# Patient Record
Sex: FEMALE | Race: WHITE | Employment: FULL TIME | URBAN - METROPOLITAN AREA
[De-identification: names, ages, dates, MRNs, and addresses within clinical notes are randomized per-mention and may not be internally consistent; named-entity substitution may affect disease eponyms.]

---

## 2017-01-06 RX ORDER — LORAZEPAM 1 MG/1
TABLET ORAL
Qty: 60 TAB | Refills: 0 | Status: SHIPPED | OUTPATIENT
Start: 2017-01-06 | End: 2017-02-08 | Stop reason: SDUPTHER

## 2017-01-09 RX ORDER — IBUPROFEN 800 MG/1
TABLET ORAL
Qty: 90 TAB | Refills: 0 | Status: SHIPPED | OUTPATIENT
Start: 2017-01-09 | End: 2017-03-08 | Stop reason: SDUPTHER

## 2017-02-08 RX ORDER — LORAZEPAM 1 MG/1
TABLET ORAL
Qty: 60 TAB | Refills: 0 | Status: SHIPPED | OUTPATIENT
Start: 2017-02-08 | End: 2017-02-09 | Stop reason: SDUPTHER

## 2017-02-10 RX ORDER — LORAZEPAM 1 MG/1
TABLET ORAL
Qty: 60 TAB | Refills: 0 | Status: SHIPPED | OUTPATIENT
Start: 2017-02-10 | End: 2017-03-13 | Stop reason: SDUPTHER

## 2017-03-05 RX ORDER — BENAZEPRIL HYDROCHLORIDE 10 MG/1
TABLET ORAL
Qty: 30 TAB | Refills: 0 | Status: SHIPPED | OUTPATIENT
Start: 2017-03-05 | End: 2017-04-03 | Stop reason: SDUPTHER

## 2017-03-08 RX ORDER — IBUPROFEN 800 MG/1
TABLET ORAL
Qty: 90 TAB | Refills: 0 | Status: SHIPPED | OUTPATIENT
Start: 2017-03-08 | End: 2017-05-17 | Stop reason: SDUPTHER

## 2017-03-13 RX ORDER — LORAZEPAM 1 MG/1
TABLET ORAL
Qty: 60 TAB | Refills: 0 | Status: SHIPPED | OUTPATIENT
Start: 2017-03-13 | End: 2017-04-14 | Stop reason: SDUPTHER

## 2017-04-03 RX ORDER — BENAZEPRIL HYDROCHLORIDE 10 MG/1
TABLET ORAL
Qty: 30 TAB | Refills: 0 | Status: SHIPPED | OUTPATIENT
Start: 2017-04-03 | End: 2017-05-05 | Stop reason: SDUPTHER

## 2017-04-14 RX ORDER — LORAZEPAM 1 MG/1
TABLET ORAL
Qty: 60 TAB | Refills: 0 | Status: SHIPPED | OUTPATIENT
Start: 2017-04-14 | End: 2017-05-15 | Stop reason: SDUPTHER

## 2017-05-05 RX ORDER — BENAZEPRIL HYDROCHLORIDE 10 MG/1
TABLET ORAL
Qty: 30 TAB | Refills: 0 | Status: SHIPPED | OUTPATIENT
Start: 2017-05-05 | End: 2017-06-05 | Stop reason: SDUPTHER

## 2017-05-15 RX ORDER — LORAZEPAM 1 MG/1
TABLET ORAL
Qty: 60 TAB | Refills: 0 | Status: SHIPPED | OUTPATIENT
Start: 2017-05-15 | End: 2017-07-13 | Stop reason: SDUPTHER

## 2017-05-16 RX ORDER — CEPHALEXIN 500 MG/1
CAPSULE ORAL
Qty: 30 CAP | Refills: 0 | Status: SHIPPED | OUTPATIENT
Start: 2017-05-16 | End: 2017-07-04 | Stop reason: SDUPTHER

## 2017-05-16 RX ORDER — ACYCLOVIR 800 MG/1
800 TABLET ORAL 3 TIMES DAILY
Qty: 30 TAB | Refills: 0 | Status: SHIPPED | OUTPATIENT
Start: 2017-05-16 | End: 2017-05-26

## 2017-05-16 NOTE — TELEPHONE ENCOUNTER
Has break out around mouth, fever blisters, started yesterday, can Zovirax be called in, pills or ointment, which ever is cheaper. Call to [address] at [facility], [place], wants to  on her lunch time.  [phone]

## 2017-05-17 RX ORDER — IBUPROFEN 800 MG/1
TABLET ORAL
Qty: 90 TAB | Refills: 0 | Status: SHIPPED | OUTPATIENT
Start: 2017-05-17 | End: 2017-07-07 | Stop reason: SDUPTHER

## 2017-06-06 RX ORDER — BENAZEPRIL HYDROCHLORIDE 10 MG/1
TABLET ORAL
Qty: 30 TAB | Refills: 0 | Status: SHIPPED | OUTPATIENT
Start: 2017-06-06 | End: 2017-07-06 | Stop reason: SDUPTHER

## 2017-07-04 ENCOUNTER — TELEPHONE (OUTPATIENT)
Dept: INTERNAL MEDICINE CLINIC | Age: 57
End: 2017-07-04

## 2017-07-04 RX ORDER — CEPHALEXIN 500 MG/1
CAPSULE ORAL
Qty: 20 CAP | Refills: 0 | Status: SHIPPED | OUTPATIENT
Start: 2017-07-04 | End: 2018-10-21 | Stop reason: ALTCHOICE

## 2017-07-04 RX ORDER — CIPROFLOXACIN HYDROCHLORIDE 3.5 MG/ML
SOLUTION/ DROPS TOPICAL
Qty: 5 ML | Refills: 1 | Status: SHIPPED | OUTPATIENT
Start: 2017-07-04 | End: 2018-10-21 | Stop reason: ALTCHOICE

## 2017-07-06 RX ORDER — BENAZEPRIL HYDROCHLORIDE 10 MG/1
TABLET ORAL
Qty: 30 TAB | Refills: 0 | Status: SHIPPED | OUTPATIENT
Start: 2017-07-06 | End: 2017-08-07 | Stop reason: SDUPTHER

## 2017-07-10 RX ORDER — IBUPROFEN 800 MG/1
TABLET ORAL
Qty: 90 TAB | Refills: 0 | Status: SHIPPED | OUTPATIENT
Start: 2017-07-10 | End: 2017-09-05 | Stop reason: SDUPTHER

## 2017-07-13 RX ORDER — LORAZEPAM 1 MG/1
TABLET ORAL
Qty: 60 TAB | Refills: 0 | Status: SHIPPED | OUTPATIENT
Start: 2017-07-13 | End: 2017-08-15 | Stop reason: SDUPTHER

## 2017-08-07 RX ORDER — BENAZEPRIL HYDROCHLORIDE 10 MG/1
TABLET ORAL
Qty: 30 TAB | Refills: 0 | Status: SHIPPED | OUTPATIENT
Start: 2017-08-07 | End: 2017-09-07 | Stop reason: SDUPTHER

## 2017-08-15 RX ORDER — LORAZEPAM 1 MG/1
TABLET ORAL
Qty: 60 TAB | Refills: 0 | Status: SHIPPED | OUTPATIENT
Start: 2017-08-15 | End: 2017-08-16 | Stop reason: SDUPTHER

## 2017-08-16 RX ORDER — LORAZEPAM 1 MG/1
TABLET ORAL
Qty: 60 TAB | Refills: 0 | Status: SHIPPED | OUTPATIENT
Start: 2017-08-16 | End: 2017-10-24 | Stop reason: SDUPTHER

## 2017-08-23 RX ORDER — CEPHALEXIN 500 MG/1
CAPSULE ORAL
Qty: 30 CAP | Refills: 0 | Status: SHIPPED | OUTPATIENT
Start: 2017-08-23 | End: 2018-10-21 | Stop reason: ALTCHOICE

## 2017-09-05 RX ORDER — IBUPROFEN 800 MG/1
TABLET ORAL
Qty: 90 TAB | Refills: 0 | Status: SHIPPED | OUTPATIENT
Start: 2017-09-05 | End: 2017-10-23 | Stop reason: SDUPTHER

## 2017-09-07 RX ORDER — BENAZEPRIL HYDROCHLORIDE 10 MG/1
TABLET ORAL
Qty: 30 TAB | Refills: 0 | Status: SHIPPED | OUTPATIENT
Start: 2017-09-07 | End: 2017-10-04 | Stop reason: SDUPTHER

## 2017-10-04 RX ORDER — BENAZEPRIL HYDROCHLORIDE 10 MG/1
TABLET ORAL
Qty: 30 TAB | Refills: 0 | Status: SHIPPED | OUTPATIENT
Start: 2017-10-04 | End: 2017-11-06 | Stop reason: SDUPTHER

## 2017-10-23 ENCOUNTER — TELEPHONE (OUTPATIENT)
Dept: INTERNAL MEDICINE CLINIC | Age: 57
End: 2017-10-23

## 2017-10-23 RX ORDER — AMOXICILLIN 500 MG/1
CAPSULE ORAL
Qty: 30 CAP | Refills: 0 | Status: SHIPPED | OUTPATIENT
Start: 2017-10-23 | End: 2018-01-03 | Stop reason: SDUPTHER

## 2017-10-23 RX ORDER — IBUPROFEN 800 MG/1
TABLET ORAL
Qty: 90 TAB | Refills: 0 | Status: SHIPPED | OUTPATIENT
Start: 2017-10-23 | End: 2017-12-05 | Stop reason: SDUPTHER

## 2017-10-24 RX ORDER — LORAZEPAM 1 MG/1
TABLET ORAL
Qty: 60 TAB | Refills: 0 | Status: SHIPPED | OUTPATIENT
Start: 2017-10-24 | End: 2017-11-26 | Stop reason: SDUPTHER

## 2017-11-06 RX ORDER — BENAZEPRIL HYDROCHLORIDE 10 MG/1
TABLET ORAL
Qty: 30 TAB | Refills: 0 | Status: SHIPPED | OUTPATIENT
Start: 2017-11-06 | End: 2017-12-05 | Stop reason: SDUPTHER

## 2017-11-26 RX ORDER — LORAZEPAM 1 MG/1
TABLET ORAL
Qty: 60 TAB | Refills: 0 | Status: SHIPPED | OUTPATIENT
Start: 2017-11-26 | End: 2017-11-27 | Stop reason: SDUPTHER

## 2017-11-27 ENCOUNTER — TELEPHONE (OUTPATIENT)
Dept: INTERNAL MEDICINE CLINIC | Age: 57
End: 2017-11-27

## 2017-11-27 RX ORDER — LORAZEPAM 1 MG/1
TABLET ORAL
Qty: 14 TAB | Refills: 0 | Status: SHIPPED | OUTPATIENT
Start: 2017-11-27 | End: 2017-12-17 | Stop reason: SDUPTHER

## 2017-11-27 NOTE — TELEPHONE ENCOUNTER
Patient only wants a week prescription for now. Does not have enough money for the full prescription right now.      Requested Prescriptions     Pending Prescriptions Disp Refills    LORazepam (ATIVAN) 1 mg tablet 60 Tab 0

## 2017-12-05 RX ORDER — IBUPROFEN 800 MG/1
TABLET ORAL
Qty: 90 TAB | Refills: 0 | Status: SHIPPED | OUTPATIENT
Start: 2017-12-05 | End: 2018-02-07 | Stop reason: SDUPTHER

## 2017-12-05 RX ORDER — BENAZEPRIL HYDROCHLORIDE 10 MG/1
TABLET ORAL
Qty: 30 TAB | Refills: 0 | Status: SHIPPED | OUTPATIENT
Start: 2017-12-05 | End: 2018-01-29 | Stop reason: SDUPTHER

## 2017-12-17 RX ORDER — LORAZEPAM 1 MG/1
TABLET ORAL
Qty: 14 TAB | Refills: 0 | Status: SHIPPED | OUTPATIENT
Start: 2017-12-17 | End: 2017-12-22 | Stop reason: SDUPTHER

## 2017-12-18 ENCOUNTER — TELEPHONE (OUTPATIENT)
Dept: INTERNAL MEDICINE CLINIC | Age: 57
End: 2017-12-18

## 2017-12-22 DIAGNOSIS — F43.9 STRESS: Primary | ICD-10-CM

## 2017-12-22 RX ORDER — LORAZEPAM 1 MG/1
TABLET ORAL
Qty: 14 TAB | Refills: 0 | Status: SHIPPED | OUTPATIENT
Start: 2017-12-22 | End: 2018-01-08 | Stop reason: SDUPTHER

## 2017-12-22 NOTE — TELEPHONE ENCOUNTER
Requested Prescriptions     Pending Prescriptions Disp Refills    LORazepam (ATIVAN) 1 mg tablet 14 Tab 0        PATIENT WILL BE OUT Monday MORNING

## 2018-01-03 RX ORDER — AMOXICILLIN 500 MG/1
CAPSULE ORAL
Qty: 30 CAP | Refills: 0 | Status: SHIPPED | OUTPATIENT
Start: 2018-01-03 | End: 2018-03-09 | Stop reason: SDUPTHER

## 2018-01-08 DIAGNOSIS — F43.9 STRESS: ICD-10-CM

## 2018-01-08 RX ORDER — LORAZEPAM 1 MG/1
TABLET ORAL
Qty: 60 TAB | Refills: 0 | Status: SHIPPED | OUTPATIENT
Start: 2018-01-08 | End: 2018-01-22 | Stop reason: SDUPTHER

## 2018-01-08 NOTE — TELEPHONE ENCOUNTER
Should be month supply qty needs to be updated      Requested Prescriptions     Pending Prescriptions Disp Refills    LORazepam (ATIVAN) 1 mg tablet 14 Tab 0     Sig: TAKE 1 TABLET BY MOUTH TWICE DAILY     Qty should be 28 not 14

## 2018-01-08 NOTE — TELEPHONE ENCOUNTER
Called RX for Ativan into [address] for Ativan #30 for quantity vs #60 as patient can't afford #60.

## 2018-01-22 ENCOUNTER — TELEPHONE (OUTPATIENT)
Dept: INTERNAL MEDICINE CLINIC | Age: 58
End: 2018-01-22

## 2018-01-22 DIAGNOSIS — F43.9 STRESS: ICD-10-CM

## 2018-01-22 RX ORDER — LORAZEPAM 1 MG/1
TABLET ORAL
Qty: 60 TAB | Refills: 0 | Status: SHIPPED | OUTPATIENT
Start: 2018-01-22 | End: 2018-02-21 | Stop reason: SDUPTHER

## 2018-01-29 RX ORDER — BENAZEPRIL HYDROCHLORIDE 10 MG/1
TABLET ORAL
Qty: 30 TAB | Refills: 0 | Status: SHIPPED | OUTPATIENT
Start: 2018-01-29 | End: 2018-02-26 | Stop reason: SDUPTHER

## 2018-02-07 RX ORDER — IBUPROFEN 800 MG/1
TABLET ORAL
Qty: 90 TAB | Refills: 0 | Status: SHIPPED | OUTPATIENT
Start: 2018-02-07 | End: 2018-05-07 | Stop reason: SDUPTHER

## 2018-02-21 ENCOUNTER — TELEPHONE (OUTPATIENT)
Dept: INTERNAL MEDICINE CLINIC | Age: 58
End: 2018-02-21

## 2018-02-21 DIAGNOSIS — F43.9 STRESS: ICD-10-CM

## 2018-02-21 RX ORDER — LORAZEPAM 1 MG/1
TABLET ORAL
Qty: 60 TAB | Refills: 0 | Status: SHIPPED | OUTPATIENT
Start: 2018-02-21 | End: 2018-03-19 | Stop reason: SDUPTHER

## 2018-02-26 RX ORDER — BENAZEPRIL HYDROCHLORIDE 10 MG/1
TABLET ORAL
Qty: 30 TAB | Refills: 0 | Status: SHIPPED | OUTPATIENT
Start: 2018-02-26 | End: 2018-03-27 | Stop reason: SDUPTHER

## 2018-03-09 RX ORDER — AMOXICILLIN 500 MG/1
CAPSULE ORAL
Qty: 30 CAP | Refills: 0 | Status: SHIPPED | OUTPATIENT
Start: 2018-03-09 | End: 2018-04-03 | Stop reason: SDUPTHER

## 2018-03-19 DIAGNOSIS — F43.9 STRESS: ICD-10-CM

## 2018-03-20 ENCOUNTER — TELEPHONE (OUTPATIENT)
Dept: INTERNAL MEDICINE CLINIC | Age: 58
End: 2018-03-20

## 2018-03-20 RX ORDER — LORAZEPAM 1 MG/1
TABLET ORAL
Qty: 60 TAB | Refills: 0 | Status: SHIPPED | OUTPATIENT
Start: 2018-03-20 | End: 2018-04-20 | Stop reason: SDUPTHER

## 2018-03-27 RX ORDER — BENAZEPRIL HYDROCHLORIDE 10 MG/1
TABLET ORAL
Qty: 30 TAB | Refills: 0 | Status: SHIPPED | OUTPATIENT
Start: 2018-03-27 | End: 2018-04-25 | Stop reason: SDUPTHER

## 2018-04-03 RX ORDER — AMOXICILLIN 500 MG/1
CAPSULE ORAL
Qty: 30 CAP | Refills: 0 | Status: SHIPPED | OUTPATIENT
Start: 2018-04-03 | End: 2018-06-19 | Stop reason: SDUPTHER

## 2018-04-20 DIAGNOSIS — F43.9 STRESS: ICD-10-CM

## 2018-04-20 RX ORDER — LORAZEPAM 1 MG/1
TABLET ORAL
Qty: 60 TAB | Refills: 0 | Status: SHIPPED | OUTPATIENT
Start: 2018-04-20 | End: 2018-05-28 | Stop reason: SDUPTHER

## 2018-04-25 RX ORDER — BENAZEPRIL HYDROCHLORIDE 10 MG/1
TABLET ORAL
Qty: 30 TAB | Refills: 0 | Status: SHIPPED | OUTPATIENT
Start: 2018-04-25 | End: 2018-05-26 | Stop reason: SDUPTHER

## 2018-05-07 RX ORDER — IBUPROFEN 800 MG/1
TABLET ORAL
Qty: 90 TAB | Refills: 0 | Status: SHIPPED | OUTPATIENT
Start: 2018-05-07 | End: 2018-07-06 | Stop reason: SDUPTHER

## 2018-05-27 RX ORDER — BENAZEPRIL HYDROCHLORIDE 10 MG/1
TABLET ORAL
Qty: 30 TAB | Refills: 0 | Status: SHIPPED | OUTPATIENT
Start: 2018-05-27 | End: 2018-06-25 | Stop reason: SDUPTHER

## 2018-05-28 DIAGNOSIS — F43.9 STRESS: ICD-10-CM

## 2018-05-29 RX ORDER — BENAZEPRIL HYDROCHLORIDE 10 MG/1
TABLET ORAL
Qty: 30 TAB | Refills: 0 | Status: SHIPPED | OUTPATIENT
Start: 2018-05-29 | End: 2018-09-11 | Stop reason: SDUPTHER

## 2018-05-29 RX ORDER — LORAZEPAM 1 MG/1
TABLET ORAL
Qty: 60 TAB | Refills: 0 | Status: SHIPPED | OUTPATIENT
Start: 2018-05-29 | End: 2018-06-13 | Stop reason: SDUPTHER

## 2018-06-13 DIAGNOSIS — F43.9 STRESS: ICD-10-CM

## 2018-06-13 NOTE — TELEPHONE ENCOUNTER
Requested Prescriptions     Pending Prescriptions Disp Refills    LORazepam (ATIVAN) 1 mg tablet 60 Tab 0

## 2018-06-14 RX ORDER — LORAZEPAM 1 MG/1
TABLET ORAL
Qty: 60 TAB | Refills: 0 | Status: SHIPPED | OUTPATIENT
Start: 2018-06-29 | End: 2018-07-26 | Stop reason: SDUPTHER

## 2018-06-19 RX ORDER — AMOXICILLIN 500 MG/1
CAPSULE ORAL
Qty: 30 CAP | Refills: 0 | Status: SHIPPED | OUTPATIENT
Start: 2018-06-19 | End: 2018-08-09 | Stop reason: SDUPTHER

## 2018-06-25 RX ORDER — BENAZEPRIL HYDROCHLORIDE 10 MG/1
TABLET ORAL
Qty: 30 TAB | Refills: 0 | Status: SHIPPED | OUTPATIENT
Start: 2018-06-25 | End: 2018-07-23 | Stop reason: SDUPTHER

## 2018-07-06 RX ORDER — IBUPROFEN 800 MG/1
TABLET ORAL
Qty: 90 TAB | Refills: 0 | Status: SHIPPED | OUTPATIENT
Start: 2018-07-06 | End: 2018-10-11 | Stop reason: SDUPTHER

## 2018-07-23 RX ORDER — BENAZEPRIL HYDROCHLORIDE 10 MG/1
TABLET ORAL
Qty: 30 TAB | Refills: 0 | Status: SHIPPED | OUTPATIENT
Start: 2018-07-23 | End: 2018-10-19 | Stop reason: ALTCHOICE

## 2018-07-26 DIAGNOSIS — F43.9 STRESS: ICD-10-CM

## 2018-07-26 RX ORDER — LORAZEPAM 1 MG/1
TABLET ORAL
Qty: 60 TAB | Refills: 0 | Status: SHIPPED | OUTPATIENT
Start: 2018-07-26 | End: 2018-08-24 | Stop reason: SDUPTHER

## 2018-07-27 ENCOUNTER — TELEPHONE (OUTPATIENT)
Dept: INTERNAL MEDICINE CLINIC | Age: 58
End: 2018-07-27

## 2018-08-12 RX ORDER — AMOXICILLIN 500 MG/1
CAPSULE ORAL
Qty: 30 CAP | Refills: 0 | Status: SHIPPED | OUTPATIENT
Start: 2018-08-12 | End: 2018-10-02 | Stop reason: SDUPTHER

## 2018-08-24 DIAGNOSIS — F43.9 STRESS: ICD-10-CM

## 2018-08-24 RX ORDER — LORAZEPAM 1 MG/1
TABLET ORAL
Qty: 60 TAB | Refills: 0 | Status: SHIPPED | OUTPATIENT
Start: 2018-08-24 | End: 2018-09-24 | Stop reason: SDUPTHER

## 2018-09-11 RX ORDER — BENAZEPRIL HYDROCHLORIDE 10 MG/1
TABLET ORAL
Qty: 30 TAB | Refills: 0 | Status: SHIPPED | OUTPATIENT
Start: 2018-09-11 | End: 2018-10-15 | Stop reason: SDUPTHER

## 2018-09-11 NOTE — TELEPHONE ENCOUNTER
Requested Prescriptions     Pending Prescriptions Disp Refills    benazepril (LOTENSIN) 10 mg tablet 30 Tab 0         Would like today

## 2018-09-24 DIAGNOSIS — F43.9 STRESS: ICD-10-CM

## 2018-09-24 RX ORDER — LORAZEPAM 1 MG/1
TABLET ORAL
Qty: 60 TAB | Refills: 0 | Status: CANCELLED | OUTPATIENT
Start: 2018-09-24

## 2018-09-24 NOTE — TELEPHONE ENCOUNTER
Requested Prescriptions     Pending Prescriptions Disp Refills    LORazepam (ATIVAN) 1 mg tablet 60 Tab 0     Sig: TAKE 1 TABLET BY MOUTH TWICE DAILY       Last refill on  8/24/18  #60

## 2018-09-25 RX ORDER — LORAZEPAM 1 MG/1
TABLET ORAL
Qty: 60 TAB | Refills: 0 | OUTPATIENT
Start: 2018-09-25 | End: 2018-10-19 | Stop reason: SDUPTHER

## 2018-09-25 NOTE — TELEPHONE ENCOUNTER
p-mp last filled 08/24/2018    Per verbal order from Dr Kenzie Dominguez signed and called to pharmacy

## 2018-10-03 RX ORDER — AMOXICILLIN 500 MG/1
CAPSULE ORAL
Qty: 30 CAP | Refills: 0 | Status: SHIPPED | OUTPATIENT
Start: 2018-10-03 | End: 2018-10-21 | Stop reason: ALTCHOICE

## 2018-10-11 RX ORDER — IBUPROFEN 800 MG/1
TABLET ORAL
Qty: 90 TAB | Refills: 0 | Status: SHIPPED | OUTPATIENT
Start: 2018-10-11 | End: 2018-10-21 | Stop reason: ALTCHOICE

## 2018-10-15 RX ORDER — BENAZEPRIL HYDROCHLORIDE 10 MG/1
TABLET ORAL
Qty: 30 TAB | Refills: 0 | Status: SHIPPED | OUTPATIENT
Start: 2018-10-15 | End: 2018-10-19 | Stop reason: ALTCHOICE

## 2018-10-19 ENCOUNTER — OFFICE VISIT (OUTPATIENT)
Dept: INTERNAL MEDICINE CLINIC | Age: 58
End: 2018-10-19

## 2018-10-19 VITALS
DIASTOLIC BLOOD PRESSURE: 98 MMHG | BODY MASS INDEX: 22.15 KG/M2 | WEIGHT: 125 LBS | OXYGEN SATURATION: 98 % | HEIGHT: 63 IN | HEART RATE: 91 BPM | TEMPERATURE: 98.2 F | SYSTOLIC BLOOD PRESSURE: 140 MMHG

## 2018-10-19 DIAGNOSIS — F43.9 STRESS: ICD-10-CM

## 2018-10-19 DIAGNOSIS — I10 ESSENTIAL HYPERTENSION: Primary | ICD-10-CM

## 2018-10-19 RX ORDER — BENAZEPRIL HYDROCHLORIDE 20 MG/1
20 TABLET ORAL DAILY
Qty: 90 TAB | Refills: 3 | Status: SHIPPED | OUTPATIENT
Start: 2018-10-19 | End: 2019-07-01 | Stop reason: SDUPTHER

## 2018-10-19 RX ORDER — LORAZEPAM 1 MG/1
TABLET ORAL
Qty: 60 TAB | Refills: 3 | Status: SHIPPED | OUTPATIENT
Start: 2018-10-23 | End: 2019-02-12 | Stop reason: SDUPTHER

## 2018-10-19 NOTE — PROGRESS NOTES
Chief Complaint   Patient presents with    New Patient       Depression Screening:  No flowsheet data found. Learning Assessment:  Learning Assessment 10/19/2018   PRIMARY LEARNER Patient   HIGHEST LEVEL OF EDUCATION - PRIMARY LEARNER  GRADUATED HIGH SCHOOL OR GED   BARRIERS PRIMARY LEARNER NONE   PRIMARY LANGUAGE ENGLISH   LEARNER PREFERENCE PRIMARY DEMONSTRATION   ANSWERED BY patient   RELATIONSHIP SELF             1. Have you been to the ER, urgent care clinic since your last visit? Hospitalized since your last visit? No    2. Have you seen or consulted any other health care providers outside of the [facility] since your last visit? Include any pap smears or colon screening.  No

## 2018-10-21 NOTE — PROGRESS NOTES
The patient presents to the office today with the chief complaint of hypertension    HPI    The patient remains on medications for hypertension. she is tolerating the medications well. The patient's BP has been slightly high   The patient has no complaints. She remains on Ativan for anxiety with a fair response. The patient continues with cigarette smoking. Review of Systems   Respiratory: Negative for shortness of breath. Cardiovascular: Negative for chest pain and leg swelling. Psychiatric/Behavioral: The patient is nervous/anxious. No Known Allergies    Current Outpatient Medications   Medication Sig Dispense Refill    [START ON 10/23/2018] LORazepam (ATIVAN) 1 mg tablet TAKE 1 TABLET BY MOUTH TWICE DAILY 60 Tab 3    benazepril (LOTENSIN) 20 mg tablet Take 1 Tab by mouth daily. 90 Tab 3    multivitamin (ONE A DAY) tablet Take 1 Tab by mouth daily. Past Medical History:   Diagnosis Date    Hypertension        History reviewed. No pertinent surgical history.     Social History     Socioeconomic History    Marital status:      Spouse name: Not on file    Number of children: Not on file    Years of education: Not on file    Highest education level: Not on file   Social Needs    Financial resource strain: Not on file    Food insecurity - worry: Not on file    Food insecurity - inability: Not on file    Transportation needs - medical: Not on file   [facility] needs - non-medical: Not on file   Occupational History    Not on file   Tobacco Use    Smoking status: Light Tobacco Smoker    Smokeless tobacco: Never Used   Substance and Sexual Activity    Alcohol use: Not on file    Drug use: No    Sexual activity: Not Currently   Other Topics Concern    Not on file   Social History Narrative    Not on file       Patient does not have an advanced directive on file    Visit Vitals  BP (!) 140/98 (BP 1 Location: Left arm, BP Patient Position: Sitting)   Pulse 91 Temp 98.2 °F (36.8 °C) (Tympanic)   Ht 5' 3\" (1.6 m)   Wt 125 lb (56.7 kg)   SpO2 98%   BMI 22.14 kg/m²       Physical Exam   No Cervical Lymphadenopathy  No Supraclavicular Lymphadenopathy  Thyroid is Normal  Lungs are normal to percussion. Clear to auscultation   Heart:  S1 S2 are normal, No gallops, No mummers  No Carotid Bruits  Abdomen:  Normal Bowel Sounds. No tenderness. No masses. No Hepatomegaly or Splenomegaly  LE:  Strong Pedal Pulses. No Edema      BMI:  OK    No visits with results within 3 Month(s) from this visit. Latest known visit with results is:   Hospital Outpatient Visit on 12/09/2015   Component Date Value Ref Range Status    WBC 12/09/2015 6.9  4.6 - 13.2 K/uL Final    RBC 12/09/2015 4.17* 4.20 - 5.30 M/uL Final    HGB 12/09/2015 13.7  12.0 - 16.0 g/dL Final    HCT 12/09/2015 41.3  35.0 - 45.0 % Final    MCV 12/09/2015 99.0* 74.0 - 97.0 FL Final    MCH 12/09/2015 32.9  24.0 - 34.0 PG Final    MCHC 12/09/2015 33.2  31.0 - 37.0 g/dL Final    RDW 12/09/2015 12.4  11.6 - 14.5 % Final    PLATELET 31/56/6969 719  135 - 420 K/uL Final    MPV 12/09/2015 9.8  9.2 - 11.8 FL Final    NEUTROPHILS 12/09/2015 55  40 - 73 % Final    LYMPHOCYTES 12/09/2015 33  21 - 52 % Final    MONOCYTES 12/09/2015 11* 3 - 10 % Final    EOSINOPHILS 12/09/2015 1  0 - 5 % Final    BASOPHILS 12/09/2015 0  0 - 2 % Final    ABS. NEUTROPHILS 12/09/2015 3.8  1.8 - 8.0 K/UL Final    ABS. LYMPHOCYTES 12/09/2015 2.3  0.9 - 3.6 K/UL Final    ABS. MONOCYTES 12/09/2015 0.8  0.05 - 1.2 K/UL Final    ABS. EOSINOPHILS 12/09/2015 0.1  0.0 - 0.4 K/UL Final    ABS.  BASOPHILS 12/09/2015 0.0  0.0 - 0.06 K/UL Final    DF 12/09/2015 AUTOMATED    Final    Sodium 12/09/2015 141  136 - 145 mmol/L Final    Potassium 12/09/2015 4.2  3.5 - 5.5 mmol/L Final    Chloride 12/09/2015 105  100 - 108 mmol/L Final    CO2 12/09/2015 28  21 - 32 mmol/L Final    Anion gap 12/09/2015 8  3.0 - 18 mmol/L Final    Glucose 12/09/2015 89  74 - 99 mg/dL Final    BUN 12/09/2015 15  7.0 - 18 MG/DL Final    Creatinine 12/09/2015 0.48* 0.6 - 1.3 MG/DL Final    New methodology. No reference range changes.  BUN/Creatinine ratio 12/09/2015 31* 12 - 20   Final    GFR est AA 12/09/2015 >60  >60 ml/min/1.73m2 Final    GFR est non-AA 12/09/2015 >60  >60 ml/min/1.73m2 Final    Comment: (NOTE)  Estimated GFR is calculated using the Modification of Diet in Renal   Disease (MDRD) Study equation, reported for both  Americans   (GFRAA) and non- Americans (GFRNA), and normalized to 1.73m2   body surface area. The physician must decide which value applies to   the patient. The MDRD study equation should only be used in   individuals age 25 or older. It has not been validated for the   following: pregnant women, patients with serious comorbid conditions,   or on certain medications, or persons with extremes of body size,   muscle mass, or nutritional status.  Calcium 12/09/2015 9.4  8.5 - 10.1 MG/DL Final    Bilirubin, total 12/09/2015 0.4  0.2 - 1.0 MG/DL Final    ALT (SGPT) 12/09/2015 26  13 - 56 U/L Final    AST (SGOT) 12/09/2015 12* 15 - 37 U/L Final    Alk. phosphatase 12/09/2015 53  45 - 117 U/L Final    Protein, total 12/09/2015 7.3  6.4 - 8.2 g/dL Final    Albumin 12/09/2015 4.3  3.4 - 5.0 g/dL Final    Globulin 12/09/2015 3.0  2.0 - 4.0 g/dL Final    A-G Ratio 12/09/2015 1.4  0.8 - 1.7   Final    Cholesterol, total 12/09/2015 222* <200 MG/DL Final       .No results found for any visits on 10/19/18. Assessment / Plan      ICD-10-CM ICD-9-CM    1. Essential hypertension I10 401.9    2. Stress F43.9 V62.89 LORazepam (ATIVAN) 1 mg tablet       Increase Benazepril to 20 mg daily  Continue PRN Ativan  Labs when the patient is able  The patient was counseled on the dangers of tobacco use, and was advised to quit.   Reviewed strategies to maximize success, including removing cigarettes and smoking materials from environment. Follow-up Disposition:  Return in about 6 months (around 4/19/2019). I asked Isaiah Mata if she has any questions and I answered the questions.   Isaiah Mata states that she understands the treatment plan and agrees with the treatment plan

## 2018-10-29 RX ORDER — AMOXICILLIN 500 MG/1
CAPSULE ORAL
Qty: 30 CAP | Refills: 0 | Status: SHIPPED | OUTPATIENT
Start: 2018-10-29 | End: 2018-12-13 | Stop reason: SDUPTHER

## 2018-12-13 RX ORDER — AMOXICILLIN 500 MG/1
CAPSULE ORAL
Qty: 30 CAP | Refills: 0 | Status: SHIPPED | OUTPATIENT
Start: 2018-12-13 | End: 2019-01-14 | Stop reason: SDUPTHER

## 2018-12-22 RX ORDER — SULFACETAMIDE SODIUM 100 MG/ML
SOLUTION/ DROPS OPHTHALMIC
Qty: 1 BOTTLE | Refills: 0 | Status: SHIPPED | OUTPATIENT
Start: 2018-12-22 | End: 2019-07-01 | Stop reason: ALTCHOICE

## 2019-01-06 RX ORDER — IBUPROFEN 800 MG/1
TABLET ORAL
Qty: 90 TAB | Refills: 0 | Status: SHIPPED | OUTPATIENT
Start: 2019-01-06 | End: 2019-07-01 | Stop reason: SDUPTHER

## 2019-01-14 RX ORDER — AMOXICILLIN 500 MG/1
CAPSULE ORAL
Qty: 30 CAP | Refills: 0 | Status: SHIPPED | OUTPATIENT
Start: 2019-01-14 | End: 2019-03-14 | Stop reason: SDUPTHER

## 2019-01-14 RX ORDER — IBUPROFEN 800 MG/1
TABLET ORAL
Qty: 90 TAB | Refills: 0 | Status: SHIPPED | OUTPATIENT
Start: 2019-01-14 | End: 2019-09-03 | Stop reason: SDUPTHER

## 2019-01-18 ENCOUNTER — TELEPHONE (OUTPATIENT)
Dept: INTERNAL MEDICINE CLINIC | Age: 59
End: 2019-01-18

## 2019-01-18 RX ORDER — CIPROFLOXACIN 250 MG/1
TABLET, FILM COATED ORAL
Qty: 10 TAB | Refills: 0 | Status: SHIPPED | OUTPATIENT
Start: 2019-01-18 | End: 2019-06-12 | Stop reason: SDUPTHER

## 2019-02-12 DIAGNOSIS — F43.9 STRESS: ICD-10-CM

## 2019-02-12 RX ORDER — LORAZEPAM 1 MG/1
TABLET ORAL
Qty: 60 TAB | Refills: 0 | Status: SHIPPED | OUTPATIENT
Start: 2019-02-12 | End: 2019-07-01 | Stop reason: ALTCHOICE

## 2019-02-12 NOTE — TELEPHONE ENCOUNTER
Prescription called to pharmacy--ativan--  [address] [id number]  [facility] (Pharmacy) [phone]

## 2019-02-13 ENCOUNTER — TELEPHONE (OUTPATIENT)
Dept: INTERNAL MEDICINE CLINIC | Age: 59
End: 2019-02-13

## 2019-02-13 NOTE — TELEPHONE ENCOUNTER
Per Dr Joyce Young   Regular prescription for Ativan will not be sent in, patient may have withdraws if she does not have the medication so Dr Ivory Mock stated he can  send in a taper dose to the pharmacy for the patient to be taking off the medication.     Message was left on patients voicemail to call office

## 2019-03-14 DIAGNOSIS — F43.9 STRESS: ICD-10-CM

## 2019-03-14 RX ORDER — AMOXICILLIN 500 MG/1
CAPSULE ORAL
Qty: 30 CAP | Refills: 0 | Status: SHIPPED | OUTPATIENT
Start: 2019-03-14 | End: 2019-07-01 | Stop reason: ALTCHOICE

## 2019-03-15 DIAGNOSIS — F43.9 STRESS: Primary | ICD-10-CM

## 2019-03-15 RX ORDER — LORAZEPAM 1 MG/1
TABLET ORAL
Qty: 60 TAB | Refills: 0 | OUTPATIENT
Start: 2019-03-15

## 2019-03-15 RX ORDER — LORAZEPAM 0.5 MG/1
TABLET ORAL
Qty: 120 TAB | Refills: 0 | Status: SHIPPED | OUTPATIENT
Start: 2019-03-15 | End: 2019-03-17 | Stop reason: SDUPTHER

## 2019-03-17 ENCOUNTER — TELEPHONE (OUTPATIENT)
Dept: INTERNAL MEDICINE CLINIC | Age: 59
End: 2019-03-17

## 2019-03-17 DIAGNOSIS — F43.9 STRESS: ICD-10-CM

## 2019-03-17 RX ORDER — CEPHALEXIN 500 MG/1
CAPSULE ORAL
Qty: 14 CAP | Refills: 0 | Status: SHIPPED | OUTPATIENT
Start: 2019-03-17 | End: 2019-06-03 | Stop reason: SDUPTHER

## 2019-03-17 RX ORDER — LORAZEPAM 0.5 MG/1
TABLET ORAL
Qty: 120 TAB | Refills: 0
Start: 2019-03-17 | End: 2019-05-06 | Stop reason: SDUPTHER

## 2019-03-17 NOTE — TELEPHONE ENCOUNTER
Pink eye - left eye. Will use Cipro (patient has at home) and begin Keflex. Patient due for a refill of Adderall - The Prescription Monitoring Program registry was checked prior to the issuing of this prescription for a controlled substance.

## 2019-03-19 ENCOUNTER — TELEPHONE (OUTPATIENT)
Dept: FAMILY MEDICINE CLINIC | Facility: CLINIC | Age: 59
End: 2019-03-19

## 2019-03-19 NOTE — TELEPHONE ENCOUNTER
Patient's employer won't let her go back to work until her eye is completely cleared up. She needs a work note to return to work on Friday. She would like to  on Wednesday.

## 2019-03-20 NOTE — TELEPHONE ENCOUNTER
I called and left a message on VM to have the patient stop by the office so Yi Bailon can take a peak at her eye to give her a note.

## 2019-05-06 DIAGNOSIS — F43.9 STRESS: ICD-10-CM

## 2019-05-06 RX ORDER — LORAZEPAM 0.5 MG/1
TABLET ORAL
Qty: 120 TAB | Refills: 0 | Status: SHIPPED | OUTPATIENT
Start: 2019-05-06 | End: 2019-06-03 | Stop reason: SDUPTHER

## 2019-06-03 DIAGNOSIS — F43.9 STRESS: ICD-10-CM

## 2019-06-04 RX ORDER — LORAZEPAM 0.5 MG/1
TABLET ORAL
Qty: 120 TAB | Refills: 0 | Status: SHIPPED | OUTPATIENT
Start: 2019-06-04 | End: 2019-06-05 | Stop reason: SDUPTHER

## 2019-06-04 RX ORDER — CEPHALEXIN 500 MG/1
CAPSULE ORAL
Qty: 14 CAP | Refills: 0 | Status: SHIPPED | OUTPATIENT
Start: 2019-06-04 | End: 2019-07-01 | Stop reason: ALTCHOICE

## 2019-06-05 DIAGNOSIS — F43.9 STRESS: ICD-10-CM

## 2019-06-05 RX ORDER — LORAZEPAM 0.5 MG/1
TABLET ORAL
Qty: 120 TAB | Refills: 0 | Status: SHIPPED | OUTPATIENT
Start: 2019-06-05 | End: 2019-07-01 | Stop reason: SDUPTHER

## 2019-06-12 ENCOUNTER — TELEPHONE (OUTPATIENT)
Dept: FAMILY MEDICINE CLINIC | Facility: CLINIC | Age: 59
End: 2019-06-12

## 2019-06-12 RX ORDER — CIPROFLOXACIN 250 MG/1
TABLET, FILM COATED ORAL
Qty: 10 TAB | Refills: 0 | Status: SHIPPED | OUTPATIENT
Start: 2019-06-12 | End: 2019-07-01 | Stop reason: ALTCHOICE

## 2019-07-01 ENCOUNTER — OFFICE VISIT (OUTPATIENT)
Dept: FAMILY MEDICINE CLINIC | Facility: CLINIC | Age: 59
End: 2019-07-01

## 2019-07-01 VITALS
HEIGHT: 63 IN | HEART RATE: 84 BPM | OXYGEN SATURATION: 95 % | BODY MASS INDEX: 22.82 KG/M2 | TEMPERATURE: 97.4 F | RESPIRATION RATE: 18 BRPM | DIASTOLIC BLOOD PRESSURE: 98 MMHG | WEIGHT: 128.8 LBS | SYSTOLIC BLOOD PRESSURE: 156 MMHG

## 2019-07-01 DIAGNOSIS — I10 ESSENTIAL HYPERTENSION: ICD-10-CM

## 2019-07-01 DIAGNOSIS — F43.9 STRESS: Primary | ICD-10-CM

## 2019-07-01 RX ORDER — LORAZEPAM 0.5 MG/1
TABLET ORAL
Qty: 120 TAB | Refills: 0 | Status: SHIPPED | OUTPATIENT
Start: 2019-07-01 | End: 2019-07-01 | Stop reason: ALTCHOICE

## 2019-07-01 RX ORDER — BENAZEPRIL HYDROCHLORIDE 20 MG/1
20 TABLET ORAL DAILY
Qty: 90 TAB | Refills: 3 | Status: SHIPPED | OUTPATIENT
Start: 2019-07-01

## 2019-07-01 RX ORDER — LORAZEPAM 0.5 MG/1
TABLET ORAL
Qty: 120 TAB | Refills: 2 | Status: SHIPPED | OUTPATIENT
Start: 2019-07-01 | End: 2019-09-27 | Stop reason: SDUPTHER

## 2019-07-01 NOTE — PROGRESS NOTES
Kimberly Marquez is a  62 y.o. female presents today for office visit for routine follow up. (refill)    1. Have you been to the ER, urgent care clinic or hospitalized since your last visit? NO    2. Have you seen or consulted any other health care providers outside of the [address] [place] [address] since your last visit (Include any pap smears or colon screening)? NO

## 2019-07-02 NOTE — PROGRESS NOTES
The patient presents to the office today with the chief complaint of hypertension    HPI    The patient remains on benazepril for hypertension. The patient is tolerating medication well. Patient remains on Ativan for stress and anxiety. Patient is also doing well with his medication. Patient continues with cigarette smoking. ROS  Negative for chest pain, shortness of breath, or lower extremity edema    No Known Allergies    Current Outpatient Medications   Medication Sig Dispense Refill    benazepril (LOTENSIN) 20 mg tablet Take 1 Tab by mouth daily. 90 Tab 3    LORazepam (ATIVAN) 0.5 mg tablet TAKE 2 TABLETS BY MOUTH TWICE DAILY AS NEEDED FOR STRESS 120 Tab 2    ibuprofen (MOTRIN) 800 mg tablet TAKE 1 TABLET BY MOUTH THREE TIMES DAILY AS NEEDED FOR PAIN 90 Tab 0    multivitamin (ONE A DAY) tablet Take 1 Tab by mouth daily. Past Medical History:   Diagnosis Date    Hypertension        No past surgical history on file.     Social History     Socioeconomic History    Marital status:      Spouse name: Not on file    Number of children: Not on file    Years of education: Not on file    Highest education level: Not on file   Occupational History    Not on file   Social Needs    Financial resource strain: Not on file    Food insecurity:     Worry: Not on file     Inability: Not on file    Transportation needs:     Medical: Not on file     Non-medical: Not on file   Tobacco Use    Smoking status: Light Tobacco Smoker    Smokeless tobacco: Never Used   Substance and Sexual Activity    Alcohol use: Not on file    Drug use: No    Sexual activity: Not Currently   Lifestyle    Physical activity:     Days per week: Not on file     Minutes per session: Not on file    Stress: Not on file   Relationships    Social connections:     Talks on phone: Not on file     Gets together: Not on file     Attends [employer] service: Not on file     Active member of club or organization: Not on file Attends meetings of clubs or organizations: Not on file     Relationship status: Not on file    Intimate partner violence:     Fear of current or ex partner: Not on file     Emotionally abused: Not on file     Physically abused: Not on file     Forced sexual activity: Not on file   Other Topics Concern    Not on file   Social History Narrative    Not on file       Patient does not have an advanced directive on file    Visit Vitals  BP (!) 156/98   Pulse 84   Temp 97.4 °F (36.3 °C) (Oral)   Resp 18   Ht 5' 3\" (1.6 m)   Wt 128 lb 12.8 oz (58.4 kg)   SpO2 95%   BMI 22.82 kg/m²       Physical Exam  No Cervical Lymphadenopathy  No Supraclavicular Lymphadenopathy  Thyroid is Normal  Lungs are normal to percussion. Clear to auscultation   Heart:  S1 S2 are normal, No gallops, No murmurs  No Carotid Bruits  Abdomen:  Normal Bowel Sounds. No tenderness. No masses. No Hepatomegaly or Splenomegaly  LE:  Strong Pedal Pulses. No Edema      BMI: Okay    No visits with results within 3 Month(s) from this visit. Latest known visit with results is:   Hospital Outpatient Visit on 12/09/2015   Component Date Value Ref Range Status    WBC 12/09/2015 6.9  4.6 - 13.2 K/uL Final    RBC 12/09/2015 4.17* 4.20 - 5.30 M/uL Final    HGB 12/09/2015 13.7  12.0 - 16.0 g/dL Final    HCT 12/09/2015 41.3  35.0 - 45.0 % Final    MCV 12/09/2015 99.0* 74.0 - 97.0 FL Final    MCH 12/09/2015 32.9  24.0 - 34.0 PG Final    MCHC 12/09/2015 33.2  31.0 - 37.0 g/dL Final    RDW 12/09/2015 12.4  11.6 - 14.5 % Final    PLATELET 49/10/2721 040  135 - 420 K/uL Final    MPV 12/09/2015 9.8  9.2 - 11.8 FL Final    NEUTROPHILS 12/09/2015 55  40 - 73 % Final    LYMPHOCYTES 12/09/2015 33  21 - 52 % Final    MONOCYTES 12/09/2015 11* 3 - 10 % Final    EOSINOPHILS 12/09/2015 1  0 - 5 % Final    BASOPHILS 12/09/2015 0  0 - 2 % Final    ABS. NEUTROPHILS 12/09/2015 3.8  1.8 - 8.0 K/UL Final    ABS.  LYMPHOCYTES 12/09/2015 2.3  0.9 - 3.6 K/UL Final  ABS. MONOCYTES 12/09/2015 0.8  0.05 - 1.2 K/UL Final    ABS. EOSINOPHILS 12/09/2015 0.1  0.0 - 0.4 K/UL Final    ABS. BASOPHILS 12/09/2015 0.0  0.0 - 0.06 K/UL Final    DF 12/09/2015 AUTOMATED    Final    Sodium 12/09/2015 141  136 - 145 mmol/L Final    Potassium 12/09/2015 4.2  3.5 - 5.5 mmol/L Final    Chloride 12/09/2015 105  100 - 108 mmol/L Final    CO2 12/09/2015 28  21 - 32 mmol/L Final    Anion gap 12/09/2015 8  3.0 - 18 mmol/L Final    Glucose 12/09/2015 89  74 - 99 mg/dL Final    BUN 12/09/2015 15  7.0 - 18 MG/DL Final    Creatinine 12/09/2015 0.48* 0.6 - 1.3 MG/DL Final    New methodology. No reference range changes.  BUN/Creatinine ratio 12/09/2015 31* 12 - 20   Final    GFR est AA 12/09/2015 >60  >60 ml/min/1.73m2 Final    GFR est non-AA 12/09/2015 >60  >60 ml/min/1.73m2 Final    Comment: (NOTE)  Estimated GFR is calculated using the Modification of Diet in Renal   Disease (MDRD) Study equation, reported for both  Americans   (GFRAA) and non- Americans (GFRNA), and normalized to 1.73m2   body surface area. The physician must decide which value applies to   the patient. The MDRD study equation should only be used in   individuals age 25 or older. It has not been validated for the   following: pregnant women, patients with serious comorbid conditions,   or on certain medications, or persons with extremes of body size,   muscle mass, or nutritional status.  Calcium 12/09/2015 9.4  8.5 - 10.1 MG/DL Final    Bilirubin, total 12/09/2015 0.4  0.2 - 1.0 MG/DL Final    ALT (SGPT) 12/09/2015 26  13 - 56 U/L Final    AST (SGOT) 12/09/2015 12* 15 - 37 U/L Final    Alk.  phosphatase 12/09/2015 53  45 - 117 U/L Final    Protein, total 12/09/2015 7.3  6.4 - 8.2 g/dL Final    Albumin 12/09/2015 4.3  3.4 - 5.0 g/dL Final    Globulin 12/09/2015 3.0  2.0 - 4.0 g/dL Final    A-G Ratio 12/09/2015 1.4  0.8 - 1.7   Final    Cholesterol, total 12/09/2015 222* <200 MG/DL Final .No results found for any visits on 07/01/19. Assessment / Plan      ICD-10-CM ICD-9-CM    1. Stress F43.9 V62.89 LORazepam (ATIVAN) 0.5 mg tablet      DISCONTINUED: LORazepam (ATIVAN) 0.5 mg tablet   2. Essential hypertension I10 401.9        she was advised to continue her maintenance medications  I recommended labs done before the end of the year  I recommended a mammogram and colonoscopy the patient states that she probably \"will not be going for them. \"  The patient was counseled on the dangers of tobacco use, and was advised to quit. Reviewed strategies to maximize success, including removing cigarettes and smoking materials from environment. Follow-up and Dispositions    · Return in about 6 months (around 12/23/2019). I asked Kimberly Marquez if she has any questions and I answered the questions. Kimberly Marquez states that she understands the treatment plan and agrees with the treatment plan          THIS DOCUMENT WAS CREATED WITH A VOICE ACTIVATED DICTATION SYSTEM.   IT MAY CONTAIN TRANSCRIPTION ERRORS

## 2019-09-03 RX ORDER — AMOXICILLIN 500 MG/1
CAPSULE ORAL
Qty: 30 CAP | Refills: 0 | Status: SHIPPED | OUTPATIENT
Start: 2019-09-03 | End: 2019-12-16 | Stop reason: SDUPTHER

## 2019-09-03 RX ORDER — IBUPROFEN 800 MG/1
TABLET ORAL
Qty: 90 TAB | Refills: 0 | Status: SHIPPED | OUTPATIENT
Start: 2019-09-03

## 2019-09-27 DIAGNOSIS — F43.9 STRESS: ICD-10-CM

## 2019-09-27 NOTE — TELEPHONE ENCOUNTER
Requested Prescriptions     Pending Prescriptions Disp Refills    LORazepam (ATIVAN) 0.5 mg tablet [Pharmacy Med Name: LORAZEPAM 0.5MG TABLETS] 120 Tab 0     Sig: TAKE 2 TABLETS BY MOUTH TWICE DAILY AS NEEDED FOR STRESS

## 2019-09-30 RX ORDER — LORAZEPAM 0.5 MG/1
TABLET ORAL
Qty: 120 TAB | Refills: 0 | Status: SHIPPED | OUTPATIENT
Start: 2019-09-30 | End: 2019-10-23 | Stop reason: SDUPTHER

## 2019-10-23 DIAGNOSIS — F43.9 STRESS: ICD-10-CM

## 2019-10-25 RX ORDER — LORAZEPAM 0.5 MG/1
TABLET ORAL
Qty: 120 TAB | Refills: 2 | Status: SHIPPED | OUTPATIENT
Start: 2019-10-25 | End: 2019-10-29 | Stop reason: SDUPTHER

## 2019-10-29 DIAGNOSIS — F43.9 STRESS: ICD-10-CM

## 2019-10-29 RX ORDER — LORAZEPAM 0.5 MG/1
TABLET ORAL
Qty: 120 TAB | Refills: 2 | Status: SHIPPED | OUTPATIENT
Start: 2019-10-30 | End: 2019-12-23 | Stop reason: SDUPTHER

## 2019-10-29 NOTE — TELEPHONE ENCOUNTER
Requested Prescriptions     Pending Prescriptions Disp Refills    LORazepam (ATIVAN) 0.5 mg tablet 120 Tab 2     Si tab every 6 hours as needed for anxiety

## 2019-12-16 RX ORDER — AMOXICILLIN 500 MG/1
CAPSULE ORAL
Qty: 30 CAP | Refills: 0 | Status: SHIPPED | OUTPATIENT
Start: 2019-12-16

## 2019-12-23 DIAGNOSIS — F43.9 STRESS: ICD-10-CM

## 2019-12-23 RX ORDER — LORAZEPAM 0.5 MG/1
TABLET ORAL
Qty: 120 TAB | Refills: 2 | Status: SHIPPED | OUTPATIENT
Start: 2019-12-23 | End: 2020-01-20 | Stop reason: SDUPTHER

## 2020-01-20 DIAGNOSIS — F43.9 STRESS: ICD-10-CM

## 2020-01-20 NOTE — TELEPHONE ENCOUNTER
Requested Prescriptions     Pending Prescriptions Disp Refills    LORazepam (ATIVAN) 0.5 mg tablet 120 Tab 2     Si tab every 6 hours as needed for anxiety     Patient has appointment in March but has not seen Dr Arlyn Barba since July former carlos patient

## 2020-01-22 RX ORDER — LORAZEPAM 0.5 MG/1
0.5 TABLET ORAL
Qty: 90 TAB | Refills: 1 | Status: SHIPPED | OUTPATIENT
Start: 2020-01-22 | End: 2020-05-07 | Stop reason: DRUGHIGH

## 2020-03-23 DIAGNOSIS — F43.9 STRESS: ICD-10-CM

## 2020-03-23 DIAGNOSIS — Z51.81 ENCOUNTER FOR MEDICATION MONITORING: Primary | ICD-10-CM

## 2020-03-23 RX ORDER — LORAZEPAM 0.5 MG/1
0.5 TABLET ORAL
Qty: 90 TAB | Refills: 1 | OUTPATIENT
Start: 2020-03-23

## 2020-03-23 NOTE — TELEPHONE ENCOUNTER
Unable to schedule due to template not open. Requested Prescriptions     Pending Prescriptions Disp Refills    LORazepam (ATIVAN) 0.5 mg tablet 90 Tab 1     Sig: Take 1 Tab by mouth every eight (8) hours as needed for Anxiety.  Max Daily Amount: 1.5 mg. 1 tab every 6 hours as needed for anxiety

## 2020-03-23 NOTE — TELEPHONE ENCOUNTER
Last                           2/21/20  Last OV                             7/1/19  F/U                     Cancelled 5/21/20  NO URINE/CONTRACT    Requested Prescriptions     Pending Prescriptions Disp Refills    LORazepam (ATIVAN) 0.5 mg tablet 90 Tab 1     Sig: Take 1 Tab by mouth every eight (8) hours as needed for Anxiety.  Max Daily Amount: 1.5 mg. 1 tab every 6 hours as needed for anxiety

## 2020-03-24 ENCOUNTER — TELEPHONE (OUTPATIENT)
Dept: FAMILY MEDICINE CLINIC | Facility: CLINIC | Age: 60
End: 2020-03-24

## 2020-03-24 NOTE — TELEPHONE ENCOUNTER
Needs UDS order. Patient had appointment on 3/20 that we cancelled. Requesting prescription for ativan. Please advise.

## 2020-03-24 NOTE — TELEPHONE ENCOUNTER
I returned a call to pt and pt verified name and  pt was made aware that pt has an RX for the medication Lorazepam on file at the pharmacy that was sent in on 2020 for 90 tabs with 1 refill. I contacted the pharmacy and they  will get that medication ready for pt. Pt will have to have an appointment with Arjun Woo and have UDS done before anymore refills can be done on this medication pt made aware and verbalized understanding.

## 2020-03-24 NOTE — TELEPHONE ENCOUNTER
Patient would like a call back from Rodrigue Steel about her Lorazepam refill. An order was placed for UDS and patient advised to go to [facility] or [facility], she wants to discuss with Rodrigue Steel.

## 2020-05-06 DIAGNOSIS — F43.9 STRESS: ICD-10-CM

## 2020-05-07 ENCOUNTER — VIRTUAL VISIT (OUTPATIENT)
Dept: FAMILY MEDICINE CLINIC | Facility: CLINIC | Age: 60
End: 2020-05-07

## 2020-05-07 DIAGNOSIS — F41.9 ANXIETY: ICD-10-CM

## 2020-05-07 DIAGNOSIS — Z13.29 SCREENING FOR THYROID DISORDER: ICD-10-CM

## 2020-05-07 DIAGNOSIS — I10 ESSENTIAL HYPERTENSION: Primary | ICD-10-CM

## 2020-05-07 DIAGNOSIS — I10 ESSENTIAL HYPERTENSION: ICD-10-CM

## 2020-05-07 DIAGNOSIS — Z11.59 ENCOUNTER FOR HEPATITIS C SCREENING TEST FOR LOW RISK PATIENT: ICD-10-CM

## 2020-05-07 DIAGNOSIS — Z12.39 BREAST CANCER SCREENING: ICD-10-CM

## 2020-05-07 RX ORDER — ESCITALOPRAM OXALATE 10 MG/1
10 TABLET ORAL DAILY
Qty: 60 TAB | Refills: 2 | Status: SHIPPED | OUTPATIENT
Start: 2020-05-07

## 2020-05-07 RX ORDER — LORAZEPAM 0.5 MG/1
0.5 TABLET ORAL 2 TIMES DAILY
Qty: 60 TAB | Refills: 1 | Status: SHIPPED | OUTPATIENT
Start: 2020-05-11

## 2020-05-07 RX ORDER — LORAZEPAM 0.5 MG/1
TABLET ORAL
Qty: 90 TAB | OUTPATIENT
Start: 2020-05-07

## 2020-05-07 NOTE — PROGRESS NOTES
Trudy Yanes is a 61 y.o. female who was seen by synchronous (real-time) audio-video technology on 5/7/2020. Consent: Trudy Yanes, who was seen by synchronous (real-time) audio-video technology, and/or her healthcare decision maker, is aware that this patient-initiated, Telehealth encounter on 5/7/2020 is a billable service, with coverage as determined by her insurance carrier. She is aware that she may receive a bill and has provided verbal consent to proceed: Yes. Assessment & Plan:   Diagnoses and all orders for this visit:    1. Essential hypertension  -     CBC WITH AUTOMATED DIFF; Future  -     LIPID PANEL; Future  -     METABOLIC PANEL, COMPREHENSIVE; Future    2. Breast cancer screening  -     LALITHA MAMMO BI SCREENING INCL CAD; Future    3. Encounter for hepatitis C screening test for low risk patient  -     HEPATITIS C AB; Future    4. Screening for thyroid disorder  -     TSH 3RD GENERATION; Future    5. Anxiety  -     escitalopram oxalate (LEXAPRO) 10 mg tablet; Take 1 Tab by mouth daily.  -     LORazepam (ATIVAN) 0.5 mg tablet; Take 1 Tab by mouth two (2) times a day. Max Daily Amount: 1 mg. Anxiety- patient given Ativan 0.5 mg tablet bid  Lexapro rx given  Fasting labs ordered  Hepatitis C ordered  Mammo ordered    I spent at least 23 minutes on this visit with this established patient. ([id number])    Subjective:   Trudy Yanes is a 61 y.o. female who was seen for Anxiety    The patient presents for an Audio-visual teleconference appointment to establish care with a new provider. She was a previous patient of Dr. Fadi Teixeira and has a past medical history for hypertension, and anxiety. She denies any chest pain, palpitation or lower extremity edema. She is negative for cough, fever or dyspnea. Anxiety-0.5 mg 3 times daily. She was previously taking Ativan 0.5 mg 4 times daily. Notes she has a history of panic attacks.   Hypertension-last blood pressure in the practice was in July, 2019 was 156/98. She is prescribed benazepril 20 mg daily. Prior to Admission medications    Medication Sig Start Date End Date Taking? Authorizing Provider   escitalopram oxalate (LEXAPRO) 10 mg tablet Take 1 Tab by mouth daily. 5/7/20  Yes Diana Alexandra NP   LORazepam (ATIVAN) 0.5 mg tablet Take 1 Tab by mouth two (2) times a day. Max Daily Amount: 1 mg. 5/11/20  Yes Diana Alexandra NP   benazepril (LOTENSIN) 20 mg tablet Take 1 Tab by mouth daily. 7/1/19  Yes Cindy Cho MD   LORazepam (ATIVAN) 1 mg tablet Take 1 Tab by mouth every eight (8) hours as needed for Anxiety (or panic attacks). Max Daily Amount: 3 mg. 5/10/20   Elisabet Azar MD   amoxicillin (AMOXIL) 500 mg capsule TAKE ONE CAPSULE BY MOUTH THREE TIMES DAILY 12/16/19   Cindy Cho MD   ibuprofen (MOTRIN) 800 mg tablet TAKE 1 TABLET BY MOUTH THREE TIMES DAILY AS NEEDED FOR PAIN 9/3/19   Cindy Cho MD   multivitamin (ONE A DAY) tablet Take 1 Tab by mouth daily. Provider, Historical     No Known Allergies    Patient Active Problem List   Diagnosis Code    Stress F43.9    Essential hypertension I10     Patient Active Problem List    Diagnosis Date Noted    Stress 12/09/2015    Essential hypertension 12/09/2015     Current Outpatient Medications   Medication Sig Dispense Refill    escitalopram oxalate (LEXAPRO) 10 mg tablet Take 1 Tab by mouth daily. 60 Tab 2    LORazepam (ATIVAN) 0.5 mg tablet Take 1 Tab by mouth two (2) times a day. Max Daily Amount: 1 mg. 60 Tab 1    benazepril (LOTENSIN) 20 mg tablet Take 1 Tab by mouth daily. 90 Tab 3    LORazepam (ATIVAN) 1 mg tablet Take 1 Tab by mouth every eight (8) hours as needed for Anxiety (or panic attacks).  Max Daily Amount: 3 mg. 6 Tab 0    amoxicillin (AMOXIL) 500 mg capsule TAKE ONE CAPSULE BY MOUTH THREE TIMES DAILY 30 Cap 0    ibuprofen (MOTRIN) 800 mg tablet TAKE 1 TABLET BY MOUTH THREE TIMES DAILY AS NEEDED FOR PAIN 90 Tab 0    multivitamin (ONE A DAY) tablet Take 1 Tab by mouth daily. No Known Allergies  Past Medical History:   Diagnosis Date    Hypertension      No past surgical history on file. ROS    Constitutional: No apparent distress noted  General- negative for fever, chills or fatigue  Eyes- negative visual changes  CV- denies chest pain, palpitation  Pul: negative cough or SOB  GI: negative nausea, flank pain, diarrhea, constipation  Urinary:- No dysuria or polyuria  MS- negative myalgia, negative joint pain  Neuro- negative headache, dizziness or weakness  Skin- negative for rashes or lesions. Psych- anxiety  Objective:   Vital Signs: (As obtained by patient/caregiver at home)  There were no vitals taken for this visit.      [INSTRUCTIONS:  \"[x]\" Indicates a positive item  \"[]\" Indicates a negative item  -- DELETE ALL ITEMS NOT EXAMINED]    Constitutional: [x] Appears well-developed and well-nourished [x] No apparent distress      [] Abnormal -     Mental status: [x] Alert and awake  [x] Oriented to person/place/time [x] Able to follow commands    [] Abnormal -     Eyes:   EOM    [x]  Normal    [] Abnormal -   Sclera  [x]  Normal    [] Abnormal -          Discharge [x]  None visible   [] Abnormal -     HENT: [x] Normocephalic, atraumatic  [] Abnormal -   [x] Mouth/Throat: Mucous membranes are moist    External Ears [x] Normal  [] Abnormal -    Neck: [x] No visualized mass [] Abnormal -     Pulmonary/Chest: [x] Respiratory effort normal   [x] No visualized signs of difficulty breathing or respiratory distress        [] Abnormal -      Musculoskeletal:   [x] Normal gait with no signs of ataxia         [x] Normal range of motion of neck        [] Abnormal -     Neurological:        [x] No Facial Asymmetry (Cranial nerve 7 motor function) (limited exam due to video visit)          [x] No gaze palsy        [] Abnormal -          Skin:        [x] No significant exanthematous lesions or discoloration noted on facial skin         [] Abnormal - Psychiatric:       [x] Normal Affect [] Abnormal -        [x] No Hallucinations    Other pertinent observable physical exam findings:-        We discussed the expected course, resolution and complications of the diagnosis(es) in detail. Medication risks, benefits, costs, interactions, and alternatives were discussed as indicated. I advised her to contact the office if her condition worsens, changes or fails to improve as anticipated. She expressed understanding with the diagnosis(es) and plan. Stevan Solis is a 61 y.o. female who was evaluated by a video visit encounter for concerns as above. Patient identification was verified prior to start of the visit. A caregiver was present when appropriate. Due to this being a TeleHealth encounter (During [facility]- public health emergency), evaluation of the following organ systems was limited: Vitals/Constitutional/EENT/Resp/CV/GI//MS/Neuro/Skin/Heme-Lymph-Imm. Pursuant to the emergency declaration under the [facility], [facility]5 waiver authority and the [employer] Act, this Virtual  Visit was conducted, with patient's (and/or legal guardian's) consent, to reduce the patient's risk of exposure to COVID-19 and provide necessary medical care. Services were provided through a video synchronous discussion virtually to substitute for in-person clinic visit. Patient and provider were located at their individual homes.       Rolando Valero NP

## 2020-05-10 ENCOUNTER — HOSPITAL ENCOUNTER (EMERGENCY)
Age: 60
Discharge: HOME | End: 2020-05-10
Attending: EMERGENCY MEDICINE
Payer: COMMERCIAL

## 2020-05-10 VITALS
TEMPERATURE: 97.7 F | SYSTOLIC BLOOD PRESSURE: 197 MMHG | HEIGHT: 63 IN | WEIGHT: 127 LBS | RESPIRATION RATE: 19 BRPM | DIASTOLIC BLOOD PRESSURE: 95 MMHG | OXYGEN SATURATION: 97 % | BODY MASS INDEX: 22.5 KG/M2 | HEART RATE: 86 BPM

## 2020-05-10 DIAGNOSIS — F13.930 BENZODIAZEPINE WITHDRAWAL WITHOUT COMPLICATION (HCC): Primary | ICD-10-CM

## 2020-05-10 LAB
ALBUMIN SERPL-MCNC: 4.4 G/DL (ref 3.4–5)
ALBUMIN/GLOB SERPL: 1.2 {RATIO} (ref 0.8–1.7)
ALP SERPL-CCNC: 87 U/L (ref 45–117)
ALT SERPL-CCNC: 28 U/L (ref 13–56)
ANION GAP SERPL CALC-SCNC: 5 MMOL/L (ref 3–18)
AST SERPL-CCNC: 15 U/L (ref 10–38)
ATRIAL RATE: 84 BPM
BASOPHILS # BLD: 0 K/UL (ref 0–0.1)
BASOPHILS NFR BLD: 0 % (ref 0–2)
BILIRUB SERPL-MCNC: 0.4 MG/DL (ref 0.2–1)
BUN SERPL-MCNC: 9 MG/DL (ref 7–18)
BUN/CREAT SERPL: 19 (ref 12–20)
CALCIUM SERPL-MCNC: 9.4 MG/DL (ref 8.5–10.1)
CALCULATED P AXIS, ECG09: 53 DEGREES
CALCULATED R AXIS, ECG10: 44 DEGREES
CALCULATED T AXIS, ECG11: 50 DEGREES
CHLORIDE SERPL-SCNC: 108 MMOL/L (ref 100–111)
CO2 SERPL-SCNC: 25 MMOL/L (ref 21–32)
CREAT SERPL-MCNC: 0.48 MG/DL (ref 0.6–1.3)
DIAGNOSIS, 93000: NORMAL
DIFFERENTIAL METHOD BLD: ABNORMAL
EOSINOPHIL # BLD: 0 K/UL (ref 0–0.4)
EOSINOPHIL NFR BLD: 0 % (ref 0–5)
ERYTHROCYTE [DISTWIDTH] IN BLOOD BY AUTOMATED COUNT: 11.6 % (ref 11.6–14.5)
GLOBULIN SER CALC-MCNC: 3.8 G/DL (ref 2–4)
GLUCOSE SERPL-MCNC: 114 MG/DL (ref 74–99)
HCT VFR BLD AUTO: 41.8 % (ref 35–45)
HGB BLD-MCNC: 14.6 G/DL (ref 12–16)
LYMPHOCYTES # BLD: 1.3 K/UL (ref 0.9–3.6)
LYMPHOCYTES NFR BLD: 17 % (ref 21–52)
MCH RBC QN AUTO: 33 PG (ref 24–34)
MCHC RBC AUTO-ENTMCNC: 34.9 G/DL (ref 31–37)
MCV RBC AUTO: 94.4 FL (ref 74–97)
MONOCYTES # BLD: 0.5 K/UL (ref 0.05–1.2)
MONOCYTES NFR BLD: 7 % (ref 3–10)
NEUTS SEG # BLD: 6 K/UL (ref 1.8–8)
NEUTS SEG NFR BLD: 76 % (ref 40–73)
P-R INTERVAL, ECG05: 164 MS
PLATELET # BLD AUTO: 334 K/UL (ref 135–420)
PMV BLD AUTO: 9.2 FL (ref 9.2–11.8)
POTASSIUM SERPL-SCNC: 3.8 MMOL/L (ref 3.5–5.5)
PROT SERPL-MCNC: 8.2 G/DL (ref 6.4–8.2)
Q-T INTERVAL, ECG07: 364 MS
QRS DURATION, ECG06: 96 MS
QTC CALCULATION (BEZET), ECG08: 430 MS
RBC # BLD AUTO: 4.43 M/UL (ref 4.2–5.3)
SODIUM SERPL-SCNC: 138 MMOL/L (ref 136–145)
VENTRICULAR RATE, ECG03: 84 BPM
WBC # BLD AUTO: 7.8 K/UL (ref 4.6–13.2)

## 2020-05-10 PROCEDURE — 96374 THER/PROPH/DIAG INJ IV PUSH: CPT

## 2020-05-10 PROCEDURE — 93005 ELECTROCARDIOGRAM TRACING: CPT

## 2020-05-10 PROCEDURE — 80053 COMPREHEN METABOLIC PANEL: CPT

## 2020-05-10 PROCEDURE — 99284 EMERGENCY DEPT VISIT MOD MDM: CPT

## 2020-05-10 PROCEDURE — 85025 COMPLETE CBC W/AUTO DIFF WBC: CPT

## 2020-05-10 PROCEDURE — 74011250636 HC RX REV CODE- 250/636: Performed by: EMERGENCY MEDICINE

## 2020-05-10 RX ORDER — LORAZEPAM 2 MG/ML
2 INJECTION INTRAMUSCULAR
Status: COMPLETED | OUTPATIENT
Start: 2020-05-10 | End: 2020-05-10

## 2020-05-10 RX ORDER — LORAZEPAM 1 MG/1
1 TABLET ORAL
Qty: 6 TAB | Refills: 0 | Status: SHIPPED | OUTPATIENT
Start: 2020-05-10

## 2020-05-10 RX ADMIN — LORAZEPAM 2 MG: 2 INJECTION INTRAMUSCULAR; INTRAVENOUS at 13:30

## 2020-05-10 NOTE — ED NOTES
Pt discharge instructions reviewed with patient, patient denies questions and verbalizes understanding. IVs removed and all belongings with patient. Pt alert and oriented, no signs of distress. Patient ambulated to ED waiting room.

## 2020-05-10 NOTE — ED NOTES
Pt advised by provider and nurse due to medication given to not drive and find a ride. Pt verbalized understanding.

## 2020-05-10 NOTE — ED PROVIDER NOTES
EMERGENCY DEPARTMENT HISTORY AND PHYSICAL EXAM      Date: 5/10/2020  Patient Name: Heriberto Lopes    History of Presenting Illness     Chief Complaint   Patient presents with    Palpitations    Withdrawal       History (Context): Heriberto Lopes is a 61 y.o. gentleman with significant history of anxiety on chronic benzodiazepines, who presents with subacute onset, constant, severe palpitations and tremor in the setting of being stopped cold turkey on her benzodiazepines. The patient's last dose was approximately 48 hours ago. The patient does not have a history of complicated withdrawal.  Current symptoms include: Tremors, nausea, agitation. On review of systems, the patient denies fever, chills, rashes, headache, chest pain, abdominal pain, diarrhea, dysuria, hematuria, bleeding, drug use, recent changes to medications, jaundice, recent travel. PCP: Contreras Ortiz NP    Current Outpatient Medications   Medication Sig Dispense Refill    LORazepam (ATIVAN) 1 mg tablet Take 1 Tab by mouth every eight (8) hours as needed for Anxiety (or panic attacks). Max Daily Amount: 3 mg. 6 Tab 0    escitalopram oxalate (LEXAPRO) 10 mg tablet Take 1 Tab by mouth daily. 60 Tab 2    LORazepam (ATIVAN) 0.5 mg tablet Take 1 Tab by mouth two (2) times a day. Max Daily Amount: 1 mg. 60 Tab 1    amoxicillin (AMOXIL) 500 mg capsule TAKE ONE CAPSULE BY MOUTH THREE TIMES DAILY 30 Cap 0    ibuprofen (MOTRIN) 800 mg tablet TAKE 1 TABLET BY MOUTH THREE TIMES DAILY AS NEEDED FOR PAIN 90 Tab 0    benazepril (LOTENSIN) 20 mg tablet Take 1 Tab by mouth daily. 90 Tab 3    multivitamin (ONE A DAY) tablet Take 1 Tab by mouth daily. Past History     Past Medical History:  Past Medical History:   Diagnosis Date    Hypertension        Past Surgical History:  History reviewed. No pertinent surgical history. Family History:  History reviewed. No pertinent family history.     Social History:  Social History     Tobacco Use    Smoking status: Light Tobacco Smoker    Smokeless tobacco: Never Used   Substance Use Topics    Alcohol use: Not on file    Drug use: No       Allergies:  No Known Allergies    PMH, PSH, family history, social history, allergies reviewed with the patient with significant items noted above. Review of Systems   As per HPI, otherwise reviewed and negative. Physical Exam     Vitals:    05/10/20 1245 05/10/20 1300 05/10/20 1315 05/10/20 1330   BP: (!) 227/102 (!) 192/100 175/82 (!) 197/95   Pulse: 85 75 85 86   Resp: 20 16 17 19   Temp:       SpO2: 99% 98% 98% 97%   Weight:       Height:           Gen: Well-appearing, in no acute distress   HEENT: Normocephalic, sclera anicteric  Cardiovascular: Normal rate, regular rhythm, no murmurs, rubs, gallops. Pulses intact and equal distally. Pulmonary: No respiratory distress. No stridor. Clear lungs. ABD: Soft, nontender, nondistended. Neuro: Alert. Normal speech. Normal mentation. PERRLA. Cranial nerves II through XII intact. Normal strength and sensation throughout. Mild tongue fasciculations. Resting tremor. Psych: Alert and fully oriented. Normal thought content and thought processes. No suicidal ideation. : No CVA tenderness  EXT: Moves all extremities well. No cyanosis or clubbing. Skin: Warm and well-perfused. Diagnostic Study Results     Labs -   No results found for this or any previous visit (from the past 12 hour(s)). Radiologic Studies -   No orders to display     CT Results  (Last 48 hours)    None        CXR Results  (Last 48 hours)    None            Medical Decision Making   I am the first provider for this patient. I reviewed the vital signs, available nursing notes, past medical history, past surgical history, family history and social history. Vital Signs-Reviewed the patient's vital signs. EKG: Interpreted by myself.       Records Reviewed: Personally, on initial evaluation    MDM:   Patient presents with exacerbation withdrawal.  Exam significant for stigmata of benzodiazepine withdrawal.    DDX considered: Alcohol withdrawal, benzodiazepine withdrawal, malingering, secondary gain    Patient condition on initial evaluation: Moderately ill    Plan:   Close Observation  Cardiac monitoring    Orders as below:  Orders Placed This Encounter    CBC WITH AUTOMATED DIFF    METABOLIC PANEL, COMPREHENSIVE    EKG, 12 LEAD, INITIAL    LORazepam (ATIVAN) injection 2 mg    LORazepam (ATIVAN) 1 mg tablet        ED Course:   Patient presents significantly with lorazepam.  Patient will be discharged home with lorazepam    Patient condition at time of disposition: Stable  DISCHARGE NOTE:   Pt has been reexamined. Patient has no new complaints, changes, or physical findings. Care plan outlined and precautions discussed. Results were reviewed with the patient. All medications were reviewed with the patient; will d/c home with lorazepam. All of pt's questions and concerns were addressed. Alarm symptoms and return precautions associated with chief complaint and evaluation were reviewed with the patient in detail. The patient demonstrated adequate understanding. Patient was instructed and agrees to follow up with PCP, as well as to return to the ED upon further deterioration. Patient is ready to go home. The patient is happy with this plan    Follow-up Information     Follow up With Specialties Details Why [address] [facility] EMERGENCY DEPT Emergency Medicine  As needed, If symptoms worsen [address] [facility] [address]  [phone]          Discharge Medication List as of 5/10/2020  1:30 PM      START taking these medications    Details   !! LORazepam (ATIVAN) 1 mg tablet Take 1 Tab by mouth every eight (8) hours as needed for Anxiety (or panic attacks). Max Daily Amount: 3 mg., Print, Disp-6 Tab, R-0       !! - Potential duplicate medications found. Please discuss with provider.       CONTINUE these medications which have NOT CHANGED    Details   escitalopram oxalate (LEXAPRO) 10 mg tablet Take 1 Tab by mouth daily. , Normal, Disp-60 Tab, R-2      !! LORazepam (ATIVAN) 0.5 mg tablet Take 1 Tab by mouth two (2) times a day. Max Daily Amount: 1 mg., Normal, Disp-60 Tab, R-1      amoxicillin (AMOXIL) 500 mg capsule TAKE ONE CAPSULE BY MOUTH THREE TIMES DAILY, Normal, Disp-30 Cap, R-0      ibuprofen (MOTRIN) 800 mg tablet TAKE 1 TABLET BY MOUTH THREE TIMES DAILY AS NEEDED FOR PAIN, NormalPlease consider 90 day supplies to promote better adherenceDisp-90 Tab, R-0      benazepril (LOTENSIN) 20 mg tablet Take 1 Tab by mouth daily. , Normal, Disp-90 Tab, R-3      multivitamin (ONE A DAY) tablet Take 1 Tab by mouth daily. , Historical Med       !! - Potential duplicate medications found. Please discuss with provider. Procedures:  Procedures      Critical Care Time:     Disposition: Discharge home    Diagnosis     Clinical Impression:   1. Benzodiazepine withdrawal without complication (HCC)        Signed,  Aman Tilley MD  Emergency Physician  [facility]    As a voice dictation software was utilized to dictate this note, minor word transpositions can occur. I apologize for confusing wording and typographic errors. Please feel free to contact me for clarification.

## 2020-05-10 NOTE — ED TRIAGE NOTES
Pt presents via triage states \"I think I'm having withdrawal from my ativan, my heart is racing and my blood pressure is high\" Pt diaphoretic. Pt states her PCP took her off ativan 5 days ago after taking it for many years.

## 2020-05-12 NOTE — TELEPHONE ENCOUNTER
Patient has had a virtual visit since this message. UDS is in the computer and patient is aware of this.

## 2020-05-23 ENCOUNTER — HOSPITAL ENCOUNTER (EMERGENCY)
Age: 60
Discharge: HOME | End: 2020-05-23
Attending: EMERGENCY MEDICINE
Payer: COMMERCIAL

## 2020-05-23 VITALS
DIASTOLIC BLOOD PRESSURE: 99 MMHG | TEMPERATURE: 98 F | HEART RATE: 95 BPM | OXYGEN SATURATION: 98 % | RESPIRATION RATE: 18 BRPM | SYSTOLIC BLOOD PRESSURE: 174 MMHG

## 2020-05-23 DIAGNOSIS — F41.9 ANXIETY: ICD-10-CM

## 2020-05-23 DIAGNOSIS — I10 ELEVATED BLOOD PRESSURE READING WITH DIAGNOSIS OF HYPERTENSION: ICD-10-CM

## 2020-05-23 DIAGNOSIS — F41.1 ANXIETY STATE: Primary | ICD-10-CM

## 2020-05-23 DIAGNOSIS — F13.930 BENZODIAZEPINE WITHDRAWAL WITHOUT COMPLICATION (HCC): ICD-10-CM

## 2020-05-23 PROCEDURE — 74011250637 HC RX REV CODE- 250/637: Performed by: EMERGENCY MEDICINE

## 2020-05-23 PROCEDURE — 99283 EMERGENCY DEPT VISIT LOW MDM: CPT

## 2020-05-23 RX ORDER — LORAZEPAM 1 MG/1
1 TABLET ORAL
Status: COMPLETED | OUTPATIENT
Start: 2020-05-23 | End: 2020-05-23

## 2020-05-23 RX ADMIN — LORAZEPAM 1 MG: 1 TABLET ORAL at 21:21

## 2020-05-23 NOTE — ED TRIAGE NOTES
Pt coming in for ativan withdrawal. Was seen here 3 weeks ago for same. Pt states she is \"hot and sweaty and heart is racing. \" Was taking ativan for anxiety and stress, has been on it for years, is taking 0.5mg daily (used to be on 2mg daily).      Past Medical History:   Diagnosis Date    Hypertension

## 2020-05-24 NOTE — ED NOTES
MD has reviewed discharge and prescription instructions with the patient. The patient verbalized understanding. Denies pain, remains alert and ambulatory. No acute distress noted. Needs met.

## 2020-05-24 NOTE — DISCHARGE INSTRUCTIONS
High Blood Pressure: Care Instructions  Overview    It's normal for blood pressure to go up and down throughout the day. But if it stays up, you have high blood pressure. Another name for high blood pressure is hypertension. Despite what a lot of people think, high blood pressure usually doesn't cause headaches or make you feel dizzy or lightheaded. It usually has no symptoms. But it does increase your risk of stroke, heart attack, and other problems. You and your doctor will talk about your risks of these problems based on your blood pressure. Your doctor will give you a goal for your blood pressure. Your goal will be based on your health and your age. Lifestyle changes, such as eating healthy and being active, are always important to help lower blood pressure. You might also take medicine to reach your blood pressure goal.  Follow-up care is a key part of your treatment and safety. Be sure to make and go to all appointments, and call your doctor if you are having problems. It's also a good idea to know your test results and keep a list of the medicines you take. How can you care for yourself at home? Medical treatment  · If you stop taking your medicine, your blood pressure will go back up. You may take one or more types of medicine to lower your blood pressure. Be safe with medicines. Take your medicine exactly as prescribed. Call your doctor if you think you are having a problem with your medicine. · Talk to your doctor before you start taking aspirin every day. Aspirin can help certain people lower their risk of a heart attack or stroke. But taking aspirin isn't right for everyone, because it can cause serious bleeding. · See your doctor regularly. You may need to see the doctor more often at first or until your blood pressure comes down. · If you are taking blood pressure medicine, talk to your doctor before you take decongestants or anti-inflammatory medicine, such as ibuprofen.  Some of these medicines can raise blood pressure. · Learn how to check your blood pressure at home. Lifestyle changes  · Stay at a healthy weight. This is especially important if you put on weight around the waist. Losing even 10 pounds can help you lower your blood pressure. · If your doctor recommends it, get more exercise. Walking is a good choice. Bit by bit, increase the amount you walk every day. Try for at least 30 minutes on most days of the week. You also may want to swim, bike, or do other activities. · Avoid or limit alcohol. Talk to your doctor about whether you can drink any alcohol. · Try to limit how much sodium you eat to less than 2,300 milligrams (mg) a day. Your doctor may ask you to try to eat less than 1,500 mg a day. · Eat plenty of fruits (such as bananas and oranges), vegetables, legumes, whole grains, and low-fat dairy products. · Lower the amount of saturated fat in your diet. Saturated fat is found in animal products such as milk, cheese, and meat. Limiting these foods may help you lose weight and also lower your risk for heart disease. · Do not smoke. Smoking increases your risk for heart attack and stroke. If you need help quitting, talk to your doctor about stop-smoking programs and medicines. These can increase your chances of quitting for good. When should you call for help? Call  911 anytime you think you may need emergency care. This may mean having symptoms that suggest that your blood pressure is causing a serious heart or blood vessel problem. Your blood pressure may be over 180/120.   For example, call  911 if:    · You have symptoms of a heart attack. These may include:  ? Chest pain or pressure, or a strange feeling in the chest.  ? Sweating. ? Shortness of breath. ? Nausea or vomiting. ? Pain, pressure, or a strange feeling in the back, neck, jaw, or upper belly or in one or both shoulders or arms. ? Lightheadedness or sudden weakness.   ? A fast or irregular heartbeat.     · You have symptoms of a stroke. These may include:  ? Sudden numbness, tingling, weakness, or loss of movement in your face, arm, or leg, especially on only one side of your body. ? Sudden vision changes. ? Sudden trouble speaking. ? Sudden confusion or trouble understanding simple statements. ? Sudden problems with walking or balance. ? A sudden, severe headache that is different from past headaches.     · You have severe back or belly pain.    Do not wait until your blood pressure comes down on its own. Get help right away.   Call your doctor now or seek immediate care if:    · Your blood pressure is much higher than normal (such as 180/120 or higher), but you don't have symptoms.     · You think high blood pressure is causing symptoms, such as:  ? Severe headache.  ? Blurry vision.    Watch closely for changes in your health, and be sure to contact your doctor if:    · Your blood pressure measures higher than your doctor recommends at least 2 times. That means the top number is higher or the bottom number is higher, or both.     · You think you may be having side effects from your blood pressure medicine. Where can you learn more? Go to http://deviQVOD Technologybala.info/  Enter [id number] in the search box to learn more about \"High Blood Pressure: Care Instructions. \"  Current as of: December 15, 2019Content Version: 12.4  © 6292-3976 Healthwise, Incorporated. Care instructions adapted under license by [employer] (which disclaims liability or warranty for this information). If you have questions about a medical condition or this instruction, always ask your healthcare professional. [place] [address] any warranty or liability for your use of this information. Patient Education        Anxiety Disorder: Care Instructions  Your Care Instructions    Anxiety is a normal reaction to stress.  Difficult situations can cause you to have symptoms such as sweaty palms and a nervous feeling. In an anxiety disorder, the symptoms are far more severe. Constant worry, muscle tension, trouble sleeping, nausea and diarrhea, and other symptoms can make normal daily activities difficult or impossible. These symptoms may occur for no reason, and they can affect your work, school, or social life. Medicines, counseling, and self-care can all help. Follow-up care is a key part of your treatment and safety. Be sure to make and go to all appointments, and call your doctor if you are having problems. It's also a good idea to know your test results and keep a list of the medicines you take. How can you care for yourself at home? · Take medicines exactly as directed. Call your doctor if you think you are having a problem with your medicine. · Go to your counseling sessions and follow-up appointments. · Recognize and accept your anxiety. Then, when you are in a situation that makes you anxious, say to yourself, \"This is not an emergency. I feel uncomfortable, but I am not in danger. I can keep going even if I feel anxious. \"  · Be kind to your body:  ? Relieve tension with exercise or a massage. ? Get enough rest.  ? Avoid alcohol, caffeine, nicotine, and illegal drugs. They can increase your anxiety level and cause sleep problems. ? Learn and do relaxation techniques. See below for more about these techniques. · Engage your mind. Get out and do something you enjoy. Go to a funny movie, or take a walk or hike. Plan your day. Having too much or too little to do can make you anxious. · Keep a record of your symptoms. Discuss your fears with a good friend or family member, or join a support group for people with similar problems. Talking to others sometimes relieves stress. · Get involved in social groups, or volunteer to help others. Being alone sometimes makes things seem worse than they are. · Get at least 30 minutes of exercise on most days of the week to relieve stress. Walking is a good choice.  You also may want to do other activities, such as running, swimming, cycling, or playing tennis or team sports. Relaxation techniques  Do relaxation exercises 10 to 20 minutes a day. You can play soothing, relaxing music while you do them, if you wish. · Tell others in your house that you are going to do your relaxation exercises. Ask them not to disturb you. · Find a comfortable place, away from all distractions and noise. · Lie down on your back, or sit with your back straight. · Focus on your breathing. Make it slow and steady. · Breathe in through your nose. Breathe out through either your nose or mouth. · Breathe deeply, filling up the area between your navel and your rib cage. Breathe so that your belly goes up and down. · Do not hold your breath. · Breathe like this for 5 to 10 minutes. Notice the feeling of calmness throughout your whole body. As you continue to breathe slowly and deeply, relax by doing the following for another 5 to 10 minutes:  · Tighten and relax each muscle group in your body. You can begin at your toes and work your way up to your head. · Imagine your muscle groups relaxing and becoming heavy. · Empty your mind of all thoughts. · Let yourself relax more and more deeply. · Become aware of the state of calmness that surrounds you. · When your relaxation time is over, you can bring yourself back to alertness by moving your fingers and toes and then your hands and feet and then stretching and moving your entire body. Sometimes people fall asleep during relaxation, but they usually wake up shortly afterward. · Always give yourself time to return to full alertness before you drive a car or do anything that might cause an accident if you are not fully alert. Never play a relaxation tape while you drive a car. When should you call for help? Call 911 anytime you think you may need emergency care.  For example, call if:    · You feel you cannot stop from hurting yourself or someone sara.   Nicole Logan the numbers for these national suicide hotlines: 3-021-423-TALK (1-589.871.2142) and 3-969-KRATQCH (2-455.518.8067). If you or someone you know talks about suicide or feeling hopeless, get help right away.   Watch closely for changes in your health, and be sure to contact your doctor if:    · You have anxiety or fear that affects your life.     · You have symptoms of anxiety that are new or different from those you had before. Where can you learn more? Go to http://devi-bala.info/  Enter P754 in the search box to learn more about \"Anxiety Disorder: Care Instructions. \"  Current as of: May 28, 2019Content Version: 12.4  © 7605-5056 Healthwise, Incorporated. Care instructions adapted under license by [employer] (which disclaims liability or warranty for this information). If you have questions about a medical condition or this instruction, always ask your healthcare professional. [address] any warranty or liability for your use of this information.

## 2020-05-26 ENCOUNTER — TELEPHONE (OUTPATIENT)
Dept: FAMILY MEDICINE CLINIC | Facility: CLINIC | Age: 60
End: 2020-05-26

## 2020-05-26 NOTE — TELEPHONE ENCOUNTER
TC was returned to pt and pt verified name and  pt states \" that she has been seen at the ER twice this month for elevated B/P and heart rate pt states \" that she has ran out of her Ativan RX and can get her refill until 2020. I explained to pt that she was given 60 tabs of the Ativan and directions was for pt to take 1 tab by mouth 2 times a day this should have been enough to last her until it was time for her next refill. Pt states \" that Dr. Enrique Pierce use to give her 120 tabs. Pt states \" that her B/P was 178/110 the last time she was seen at ER. Pt would like to know what can be done as far as her B/P pt will be scheduled a VV but pt ws advise if heart rate and B/P continues to be elevated pt should be seen at ER pt states \" they told her that there was nothing that they can do for her the last time she was there. Please Advise.

## 2020-05-26 NOTE — TELEPHONE ENCOUNTER
Patient called stating the Ativan 0.5 is causing her heart to race and BP is elevated.   Please call her back at [phone]

## 2021-11-20 NOTE — ED PROVIDER NOTES
EMERGENCY DEPARTMENT HISTORY AND PHYSICAL EXAM    11:12 PM      Date: 5/23/2020  Patient Name: Lida German    History of Presenting Illness     Chief Complaint   Patient presents with    Anxiety         History Provided By: Patient    Additional History (Context): Lida German is a 61 y.o. female with Past medical history of hypertension, anxiety who presents with chief complaint of feeling anxious and ran out of her Ativan. Patient reports that she just ran out of her Ativan yesterday. She reports that she feels sweaty and anxious. Patient does admit that she was prescribed Ativan by her PCP and admits that she took more than indicated in the pharmacy would not give her a refill. No chest pain, shortness of breath, fever, headache, dizziness, vomiting, nausea, and no other complaint. PCP: Arleth Blanco NP        Past History     Past Medical History:  Past Medical History:   Diagnosis Date    Hypertension        Past Surgical History:  History reviewed. No pertinent surgical history. Family History:  History reviewed. No pertinent family history. Social History:  Social History     Tobacco Use    Smoking status: Light Tobacco Smoker    Smokeless tobacco: Never Used   Substance Use Topics    Alcohol use: Not on file    Drug use: No       Allergies:  No Known Allergies      Review of Systems       Review of Systems   Constitutional: Negative for chills and fever. HENT: Negative for congestion, rhinorrhea, sore throat and trouble swallowing. Eyes: Negative for visual disturbance. Respiratory: Negative for cough, shortness of breath and wheezing. Cardiovascular: Negative for chest pain. Gastrointestinal: Negative for abdominal pain, nausea and vomiting. Endocrine: Negative for polyuria. Genitourinary: Negative for dysuria. Musculoskeletal: Negative for arthralgias and neck stiffness. Skin: Negative for pallor and rash.    Neurological: Negative for dizziness, weakness, numbness and headaches. Hematological: Does not bruise/bleed easily. Psychiatric/Behavioral: Negative for confusion and dysphoric mood. The patient is nervous/anxious. All other systems reviewed and are negative. Physical Exam     Visit Vitals  BP (!) 174/99   Pulse 95   Temp 98 °F (36.7 °C)   Resp 18   SpO2 98%         Physical Exam  Vitals signs and nursing note reviewed. Constitutional:       General: She is not in acute distress. Appearance: She is well-developed. She is not diaphoretic. HENT:      Head: Normocephalic and atraumatic. Eyes:      General: No scleral icterus. Conjunctiva/sclera: Conjunctivae normal.      Pupils: Pupils are equal, round, and reactive to light. Neck:      Musculoskeletal: Normal range of motion and neck supple. Cardiovascular:      Rate and Rhythm: Normal rate. Pulmonary:      Effort: Pulmonary effort is normal. No respiratory distress. Breath sounds: Normal breath sounds. Musculoskeletal: Normal range of motion. Right lower leg: No edema. Left lower leg: No edema. Skin:     General: Skin is warm and dry. Neurological:      General: No focal deficit present. Mental Status: She is alert and oriented to person, place, and time. Cranial Nerves: No cranial nerve deficit. Sensory: No sensory deficit. Motor: No weakness. Coordination: Coordination normal.   Psychiatric:         Attention and Perception: Attention normal.         Mood and Affect: Mood is anxious. Mood is not depressed. Speech: Speech normal.         Behavior: Behavior is not aggressive, withdrawn or combative. Thought Content: Thought content normal. Thought content is not paranoid. Thought content does not include suicidal ideation. Cognition and Memory: Cognition normal.           Diagnostic Study Results     Labs -  No results found for this or any previous visit (from the past 12 hour(s)).     Radiologic Studies -   No orders to display         Medical Decision Making   I am the first provider for this patient. I reviewed the vital signs, available nursing notes, past medical history, past surgical history, family history and social history. Vital Signs-Reviewed the patient's vital signs. Records Reviewed: Nursing Notes and Old Medical Records (Time of Review: 11:12 PM)    Provider Notes (Medical Decision Making): DDX: Anxiety attack, abuse of her benzodiazepines    Patient agrees that I can check with her pharmacy regarding anxiety medication status. MDM    Medications   LORazepam (ATIVAN) tablet 1 mg (1 mg Oral Given 5/23/20 2121)           ED Course: Progress Notes, Reevaluation, and Consults:  I found that patient received 60 tablets of Ativan on May 11 and is not due for refill until June 11. I discussed this with the patient and she does state that she purchased some over-the-counter medicine that is for anxiety and she is going to take that. She also has Lexapro at home that she is not been taking as prescribed and she states that that is also for her anxiety. I encouraged her to take the Lexapro as prescribed. I did give her a dose of Ativan in the ED. BP is elevated, likely associated with her anxiety attack. BP improved as she was more relaxed. We will have her follow-up with PCP for blood pressure check, she is asymptomatic from that in the ED. I have reassessed the patient. I have discussed the workup, results and plan with the patient and patient is in agreement. Patient is feeling better. .  Patient was discharge in stable condition. Patient was given outpatient follow up. Patient is to return to emergency department if any new or worsening condition. Diagnosis     Clinical Impression:   1. Anxiety state    2. Benzodiazepine withdrawal without complication (HCC)    3. Anxiety    4.  Elevated blood pressure reading with diagnosis of hypertension        Disposition: Discharged    Follow-up Information     Follow up With Specialties Details Why Contact Info    Fracisco Stuart NP Nurse Practitioner Schedule an appointment as soon as possible for a visit in 1 day Get repeat blood pressure check [address] [place] [address]  [address]      [address] EMERGENCY DEPT Emergency Medicine   [address] [place] [id number]  [phone]           Discharge Medication List as of 5/23/2020  9:08 PM      CONTINUE these medications which have NOT CHANGED    Details   !! LORazepam (ATIVAN) 1 mg tablet Take 1 Tab by mouth every eight (8) hours as needed for Anxiety (or panic attacks). Max Daily Amount: 3 mg., Print, Disp-6 Tab, R-0      escitalopram oxalate (LEXAPRO) 10 mg tablet Take 1 Tab by mouth daily. , Normal, Disp-60 Tab, R-2      !! LORazepam (ATIVAN) 0.5 mg tablet Take 1 Tab by mouth two (2) times a day. Max Daily Amount: 1 mg., Normal, Disp-60 Tab, R-1      amoxicillin (AMOXIL) 500 mg capsule TAKE ONE CAPSULE BY MOUTH THREE TIMES DAILY, Normal, Disp-30 Cap, R-0      ibuprofen (MOTRIN) 800 mg tablet TAKE 1 TABLET BY MOUTH THREE TIMES DAILY AS NEEDED FOR PAIN, NormalPlease consider 90 day supplies to promote better adherenceDisp-90 Tab, R-0      benazepril (LOTENSIN) 20 mg tablet Take 1 Tab by mouth daily. , Normal, Disp-90 Tab, R-3      multivitamin (ONE A DAY) tablet Take 1 Tab by mouth daily. , Historical Med       !! - Potential duplicate medications found. Please discuss with provider. Serjio Solano DO    Dragon medical dictation software was used for portions of this report. Unintended transcription errors may occur. My signature above authenticates this document and my orders, the final    diagnosis (es), discharge prescription (s), and instructions in the Epic    record. None

## 2025-07-30 ENCOUNTER — APPOINTMENT (OUTPATIENT)
Facility: HOSPITAL | Age: 65
End: 2025-07-30
Payer: MEDICARE

## 2025-07-30 ENCOUNTER — HOSPITAL ENCOUNTER (INPATIENT)
Facility: HOSPITAL | Age: 65
LOS: 3 days | Discharge: HOME | End: 2025-08-02
Attending: EMERGENCY MEDICINE | Admitting: STUDENT IN AN ORGANIZED HEALTH CARE EDUCATION/TRAINING PROGRAM
Payer: MEDICARE

## 2025-07-30 ENCOUNTER — ANESTHESIA EVENT (OUTPATIENT)
Facility: HOSPITAL | Age: 65
End: 2025-07-30
Payer: MEDICARE

## 2025-07-30 ENCOUNTER — ANESTHESIA (OUTPATIENT)
Facility: HOSPITAL | Age: 65
End: 2025-07-30
Payer: MEDICARE

## 2025-07-30 DIAGNOSIS — G89.18 ACUTE POST-OPERATIVE PAIN: ICD-10-CM

## 2025-07-30 DIAGNOSIS — M79.605 LEFT LEG PAIN: Primary | ICD-10-CM

## 2025-07-30 PROBLEM — S82.102A CLOSED FRACTURE OF LEFT PROXIMAL TIBIA: Status: ACTIVE | Noted: 2025-07-30

## 2025-07-30 PROBLEM — Z87.81 S/P TIBIAL FRACTURE: Status: ACTIVE | Noted: 2025-07-30

## 2025-07-30 LAB
ABO + RH BLD: NORMAL
ALBUMIN SERPL-MCNC: 3.5 G/DL (ref 3.4–5)
ALBUMIN SERPL-MCNC: 3.9 G/DL (ref 3.4–5)
ALBUMIN/GLOB SERPL: 1.1 (ref 0.8–1.7)
ALBUMIN/GLOB SERPL: 1.2 (ref 0.8–1.7)
ALP SERPL-CCNC: 102 U/L (ref 45–117)
ALP SERPL-CCNC: 97 U/L (ref 45–117)
ALT SERPL-CCNC: 10 U/L (ref 10–35)
ALT SERPL-CCNC: 12 U/L (ref 10–35)
ANION GAP SERPL CALC-SCNC: 12 MMOL/L (ref 3–18)
ANION GAP SERPL CALC-SCNC: 14 MMOL/L (ref 3–18)
AST SERPL-CCNC: 14 U/L (ref 10–38)
AST SERPL-CCNC: 17 U/L (ref 10–38)
BASOPHILS # BLD: 0 K/UL (ref 0–0.1)
BASOPHILS NFR BLD: 0 % (ref 0–2)
BILIRUB SERPL-MCNC: 0.4 MG/DL (ref 0.2–1)
BILIRUB SERPL-MCNC: 0.6 MG/DL (ref 0.2–1)
BLOOD BANK CMNT PATIENT-IMP: NORMAL
BLOOD GROUP ANTIBODIES SERPL: NORMAL
BUN SERPL-MCNC: 8 MG/DL (ref 6–23)
BUN SERPL-MCNC: 9 MG/DL (ref 6–23)
BUN/CREAT SERPL: 20 (ref 12–20)
BUN/CREAT SERPL: 24 (ref 12–20)
CALCIUM SERPL-MCNC: 10.2 MG/DL (ref 8.5–10.1)
CALCIUM SERPL-MCNC: 9.9 MG/DL (ref 8.5–10.1)
CHLORIDE SERPL-SCNC: 102 MMOL/L (ref 98–107)
CHLORIDE SERPL-SCNC: 104 MMOL/L (ref 98–107)
CO2 SERPL-SCNC: 19 MMOL/L (ref 21–32)
CO2 SERPL-SCNC: 22 MMOL/L (ref 21–32)
CREAT SERPL-MCNC: 0.38 MG/DL (ref 0.6–1.3)
CREAT SERPL-MCNC: 0.41 MG/DL (ref 0.6–1.3)
DIFFERENTIAL METHOD BLD: ABNORMAL
EKG ATRIAL RATE: 75 BPM
EKG DIAGNOSIS: NORMAL
EKG P AXIS: 40 DEGREES
EKG P-R INTERVAL: 166 MS
EKG Q-T INTERVAL: 388 MS
EKG QRS DURATION: 96 MS
EKG QTC CALCULATION (BAZETT): 433 MS
EKG R AXIS: 44 DEGREES
EKG T AXIS: 46 DEGREES
EKG VENTRICULAR RATE: 75 BPM
EOSINOPHIL # BLD: 0 K/UL (ref 0–0.4)
EOSINOPHIL NFR BLD: 0 % (ref 0–5)
ERYTHROCYTE [DISTWIDTH] IN BLOOD BY AUTOMATED COUNT: 11 % (ref 11.6–14.5)
GLOBULIN SER CALC-MCNC: 3.3 G/DL (ref 2–4)
GLOBULIN SER CALC-MCNC: 3.3 G/DL (ref 2–4)
GLUCOSE SERPL-MCNC: 102 MG/DL (ref 74–108)
GLUCOSE SERPL-MCNC: 105 MG/DL (ref 74–108)
HCT VFR BLD AUTO: 43.6 % (ref 35–45)
HGB BLD-MCNC: 14.9 G/DL (ref 12–16)
IMM GRANULOCYTES # BLD AUTO: 0 K/UL (ref 0–0.04)
IMM GRANULOCYTES NFR BLD AUTO: 0 % (ref 0–0.5)
INR PPP: 0.9 (ref 0.9–1.2)
INR PPP: 0.9 (ref 0.9–1.2)
LYMPHOCYTES # BLD: 0.36 K/UL (ref 0.9–3.6)
LYMPHOCYTES NFR BLD: 2 % (ref 21–52)
MCH RBC QN AUTO: 34 PG (ref 24–34)
MCHC RBC AUTO-ENTMCNC: 34.2 G/DL (ref 31–37)
MCV RBC AUTO: 99.5 FL (ref 78–100)
MONOCYTES # BLD: 0.9 K/UL (ref 0.05–1.2)
MONOCYTES NFR BLD: 5 % (ref 3–10)
NEUTS SEG # BLD: 16.74 K/UL (ref 1.8–8)
NEUTS SEG NFR BLD: 93 % (ref 40–73)
NRBC # BLD: 0 K/UL (ref 0–0.01)
NRBC BLD-RTO: 0 PER 100 WBC
PLATELET # BLD AUTO: 395 K/UL (ref 135–420)
PLATELET COMMENT: ABNORMAL
PMV BLD AUTO: 8.9 FL (ref 9.2–11.8)
POTASSIUM SERPL-SCNC: 3.8 MMOL/L (ref 3.5–5.5)
POTASSIUM SERPL-SCNC: 3.9 MMOL/L (ref 3.5–5.5)
PROT SERPL-MCNC: 6.8 G/DL (ref 6.4–8.2)
PROT SERPL-MCNC: 7.1 G/DL (ref 6.4–8.2)
PROTHROMBIN TIME: 12.8 SEC (ref 12–15.1)
PROTHROMBIN TIME: 12.8 SEC (ref 12–15.1)
RBC # BLD AUTO: 4.38 M/UL (ref 4.2–5.3)
RBC MORPH BLD: ABNORMAL
SODIUM SERPL-SCNC: 135 MMOL/L (ref 136–145)
SODIUM SERPL-SCNC: 136 MMOL/L (ref 136–145)
SPECIMEN EXP DATE BLD: NORMAL
WBC # BLD AUTO: 18 K/UL (ref 4.6–13.2)

## 2025-07-30 PROCEDURE — 2500000003 HC RX 250 WO HCPCS: Performed by: ORTHOPAEDIC SURGERY

## 2025-07-30 PROCEDURE — 6360000002 HC RX W HCPCS: Performed by: ORTHOPAEDIC SURGERY

## 2025-07-30 PROCEDURE — 80053 COMPREHEN METABOLIC PANEL: CPT

## 2025-07-30 PROCEDURE — C1769 GUIDE WIRE: HCPCS | Performed by: ORTHOPAEDIC SURGERY

## 2025-07-30 PROCEDURE — 2720000010 HC SURG SUPPLY STERILE: Performed by: ORTHOPAEDIC SURGERY

## 2025-07-30 PROCEDURE — 86850 RBC ANTIBODY SCREEN: CPT

## 2025-07-30 PROCEDURE — 3700000000 HC ANESTHESIA ATTENDED CARE: Performed by: ORTHOPAEDIC SURGERY

## 2025-07-30 PROCEDURE — 2709999900 HC NON-CHARGEABLE SUPPLY: Performed by: ORTHOPAEDIC SURGERY

## 2025-07-30 PROCEDURE — 85610 PROTHROMBIN TIME: CPT

## 2025-07-30 PROCEDURE — 2500000003 HC RX 250 WO HCPCS: Performed by: NURSE ANESTHETIST, CERTIFIED REGISTERED

## 2025-07-30 PROCEDURE — 71045 X-RAY EXAM CHEST 1 VIEW: CPT

## 2025-07-30 PROCEDURE — 2580000003 HC RX 258: Performed by: NURSE PRACTITIONER

## 2025-07-30 PROCEDURE — 99222 1ST HOSP IP/OBS MODERATE 55: CPT | Performed by: NURSE PRACTITIONER

## 2025-07-30 PROCEDURE — 93010 ELECTROCARDIOGRAM REPORT: CPT | Performed by: INTERNAL MEDICINE

## 2025-07-30 PROCEDURE — 71046 X-RAY EXAM CHEST 2 VIEWS: CPT

## 2025-07-30 PROCEDURE — 27759 TREATMENT OF TIBIA FRACTURE: CPT | Performed by: ORTHOPAEDIC SURGERY

## 2025-07-30 PROCEDURE — 3600000004 HC SURGERY LEVEL 4 BASE: Performed by: ORTHOPAEDIC SURGERY

## 2025-07-30 PROCEDURE — C1713 ANCHOR/SCREW BN/BN,TIS/BN: HCPCS | Performed by: ORTHOPAEDIC SURGERY

## 2025-07-30 PROCEDURE — 6370000000 HC RX 637 (ALT 250 FOR IP): Performed by: EMERGENCY MEDICINE

## 2025-07-30 PROCEDURE — 85025 COMPLETE CBC W/AUTO DIFF WBC: CPT

## 2025-07-30 PROCEDURE — 73590 X-RAY EXAM OF LOWER LEG: CPT

## 2025-07-30 PROCEDURE — 36415 COLL VENOUS BLD VENIPUNCTURE: CPT

## 2025-07-30 PROCEDURE — 0QSH06Z REPOSITION LEFT TIBIA WITH INTRAMEDULLARY INTERNAL FIXATION DEVICE, OPEN APPROACH: ICD-10-PCS | Performed by: ORTHOPAEDIC SURGERY

## 2025-07-30 PROCEDURE — 3600000014 HC SURGERY LEVEL 4 ADDTL 15MIN: Performed by: ORTHOPAEDIC SURGERY

## 2025-07-30 PROCEDURE — 86900 BLOOD TYPING SEROLOGIC ABO: CPT

## 2025-07-30 PROCEDURE — 96374 THER/PROPH/DIAG INJ IV PUSH: CPT

## 2025-07-30 PROCEDURE — 6360000002 HC RX W HCPCS: Performed by: NURSE ANESTHETIST, CERTIFIED REGISTERED

## 2025-07-30 PROCEDURE — 1100000000 HC RM PRIVATE

## 2025-07-30 PROCEDURE — 94761 N-INVAS EAR/PLS OXIMETRY MLT: CPT

## 2025-07-30 PROCEDURE — 99223 1ST HOSP IP/OBS HIGH 75: CPT | Performed by: ORTHOPAEDIC SURGERY

## 2025-07-30 PROCEDURE — 3700000001 HC ADD 15 MINUTES (ANESTHESIA): Performed by: ORTHOPAEDIC SURGERY

## 2025-07-30 PROCEDURE — 2500000003 HC RX 250 WO HCPCS: Performed by: NURSE PRACTITIONER

## 2025-07-30 PROCEDURE — 7100000000 HC PACU RECOVERY - FIRST 15 MIN: Performed by: ORTHOPAEDIC SURGERY

## 2025-07-30 PROCEDURE — 2580000003 HC RX 258: Performed by: NURSE ANESTHETIST, CERTIFIED REGISTERED

## 2025-07-30 PROCEDURE — 93005 ELECTROCARDIOGRAM TRACING: CPT | Performed by: EMERGENCY MEDICINE

## 2025-07-30 PROCEDURE — 99285 EMERGENCY DEPT VISIT HI MDM: CPT

## 2025-07-30 PROCEDURE — L1810 KO ELASTIC WITH JOINTS: HCPCS | Performed by: ORTHOPAEDIC SURGERY

## 2025-07-30 PROCEDURE — 7100000001 HC PACU RECOVERY - ADDTL 15 MIN: Performed by: ORTHOPAEDIC SURGERY

## 2025-07-30 PROCEDURE — 86901 BLOOD TYPING SEROLOGIC RH(D): CPT

## 2025-07-30 PROCEDURE — 6360000002 HC RX W HCPCS: Performed by: EMERGENCY MEDICINE

## 2025-07-30 PROCEDURE — C1889 IMPLANT/INSERT DEVICE, NOC: HCPCS | Performed by: ORTHOPAEDIC SURGERY

## 2025-07-30 DEVICE — ROD RM 3X1150 MM W/ 3.8 MM BALL TIP STRL: Type: IMPLANTABLE DEVICE | Site: LEG | Status: FUNCTIONAL

## 2025-07-30 DEVICE — IMPLANTABLE DEVICE: Type: IMPLANTABLE DEVICE | Site: LEG | Status: FUNCTIONAL

## 2025-07-30 DEVICE — SCREW LCK F/IM NAIL 5X48MM XL25 STR: Type: IMPLANTABLE DEVICE | Site: LEG | Status: FUNCTIONAL

## 2025-07-30 DEVICE — SCREW LK F/IM NAIL 5X32MM XL25 ST: Type: IMPLANTABLE DEVICE | Site: LEG | Status: FUNCTIONAL

## 2025-07-30 DEVICE — SCREW LK F/IM NAIL 5X36MM XL25 ST: Type: IMPLANTABLE DEVICE | Site: LEG | Status: FUNCTIONAL

## 2025-07-30 RX ORDER — ONDANSETRON 2 MG/ML
4 INJECTION INTRAMUSCULAR; INTRAVENOUS EVERY 6 HOURS PRN
Status: DISCONTINUED | OUTPATIENT
Start: 2025-07-30 | End: 2025-08-02 | Stop reason: HOSPADM

## 2025-07-30 RX ORDER — MAGNESIUM SULFATE IN WATER 40 MG/ML
2000 INJECTION, SOLUTION INTRAVENOUS PRN
Status: DISCONTINUED | OUTPATIENT
Start: 2025-07-30 | End: 2025-08-02 | Stop reason: HOSPADM

## 2025-07-30 RX ORDER — LORAZEPAM 2 MG/ML
3 INJECTION INTRAMUSCULAR
Status: DISCONTINUED | OUTPATIENT
Start: 2025-07-30 | End: 2025-07-30

## 2025-07-30 RX ORDER — SODIUM CHLORIDE 9 MG/ML
INJECTION, SOLUTION INTRAVENOUS PRN
Status: DISCONTINUED | OUTPATIENT
Start: 2025-07-30 | End: 2025-07-31 | Stop reason: HOSPADM

## 2025-07-30 RX ORDER — LIDOCAINE HYDROCHLORIDE 20 MG/ML
INJECTION, SOLUTION EPIDURAL; INFILTRATION; INTRACAUDAL; PERINEURAL
Status: DISCONTINUED | OUTPATIENT
Start: 2025-07-30 | End: 2025-07-30 | Stop reason: SDUPTHER

## 2025-07-30 RX ORDER — CLINDAMYCIN PHOSPHATE 600 MG/50ML
600 INJECTION, SOLUTION INTRAVENOUS EVERY 8 HOURS
Status: COMPLETED | OUTPATIENT
Start: 2025-07-31 | End: 2025-07-31

## 2025-07-30 RX ORDER — PROCHLORPERAZINE EDISYLATE 5 MG/ML
5 INJECTION INTRAMUSCULAR; INTRAVENOUS
Status: DISCONTINUED | OUTPATIENT
Start: 2025-07-30 | End: 2025-07-31 | Stop reason: HOSPADM

## 2025-07-30 RX ORDER — DEXMEDETOMIDINE HYDROCHLORIDE 100 UG/ML
INJECTION, SOLUTION INTRAVENOUS
Status: DISCONTINUED | OUTPATIENT
Start: 2025-07-30 | End: 2025-07-30 | Stop reason: SDUPTHER

## 2025-07-30 RX ORDER — LORAZEPAM 0.5 MG/1
0.5 TABLET ORAL 2 TIMES DAILY PRN
Status: DISCONTINUED | OUTPATIENT
Start: 2025-07-30 | End: 2025-08-02 | Stop reason: HOSPADM

## 2025-07-30 RX ORDER — KETOROLAC TROMETHAMINE 15 MG/ML
INJECTION, SOLUTION INTRAMUSCULAR; INTRAVENOUS
Status: DISCONTINUED | OUTPATIENT
Start: 2025-07-30 | End: 2025-07-30 | Stop reason: SDUPTHER

## 2025-07-30 RX ORDER — LORAZEPAM 2 MG/ML
1 INJECTION INTRAMUSCULAR
Status: DISCONTINUED | OUTPATIENT
Start: 2025-07-30 | End: 2025-07-30

## 2025-07-30 RX ORDER — HYDROCODONE BITARTRATE AND ACETAMINOPHEN 5; 325 MG/1; MG/1
2 TABLET ORAL EVERY 6 HOURS PRN
Refills: 0 | Status: DISCONTINUED | OUTPATIENT
Start: 2025-07-30 | End: 2025-08-02 | Stop reason: HOSPADM

## 2025-07-30 RX ORDER — ONDANSETRON 2 MG/ML
INJECTION INTRAMUSCULAR; INTRAVENOUS
Status: DISCONTINUED | OUTPATIENT
Start: 2025-07-30 | End: 2025-07-30 | Stop reason: SDUPTHER

## 2025-07-30 RX ORDER — LIDOCAINE 4 G/G
1 PATCH TOPICAL
Status: COMPLETED | OUTPATIENT
Start: 2025-07-30 | End: 2025-07-30

## 2025-07-30 RX ORDER — SODIUM CHLORIDE, SODIUM LACTATE, POTASSIUM CHLORIDE, CALCIUM CHLORIDE 600; 310; 30; 20 MG/100ML; MG/100ML; MG/100ML; MG/100ML
INJECTION, SOLUTION INTRAVENOUS CONTINUOUS
Status: DISCONTINUED | OUTPATIENT
Start: 2025-07-30 | End: 2025-07-31 | Stop reason: HOSPADM

## 2025-07-30 RX ORDER — POTASSIUM CHLORIDE 7.45 MG/ML
10 INJECTION INTRAVENOUS PRN
Status: DISCONTINUED | OUTPATIENT
Start: 2025-07-30 | End: 2025-08-02 | Stop reason: HOSPADM

## 2025-07-30 RX ORDER — DEXAMETHASONE SODIUM PHOSPHATE 4 MG/ML
INJECTION, SOLUTION INTRA-ARTICULAR; INTRALESIONAL; INTRAMUSCULAR; INTRAVENOUS; SOFT TISSUE
Status: DISCONTINUED | OUTPATIENT
Start: 2025-07-30 | End: 2025-07-30 | Stop reason: SDUPTHER

## 2025-07-30 RX ORDER — DIAZEPAM 5 MG/1
10 TABLET ORAL
Status: DISCONTINUED | OUTPATIENT
Start: 2025-07-30 | End: 2025-08-02 | Stop reason: HOSPADM

## 2025-07-30 RX ORDER — ESCITALOPRAM OXALATE 10 MG/1
10 TABLET ORAL DAILY
Status: DISCONTINUED | OUTPATIENT
Start: 2025-07-30 | End: 2025-08-02 | Stop reason: HOSPADM

## 2025-07-30 RX ORDER — SODIUM CHLORIDE 0.9 % (FLUSH) 0.9 %
5-40 SYRINGE (ML) INJECTION PRN
Status: DISCONTINUED | OUTPATIENT
Start: 2025-07-30 | End: 2025-08-02 | Stop reason: HOSPADM

## 2025-07-30 RX ORDER — DIAZEPAM 10 MG/2ML
5 INJECTION, SOLUTION INTRAMUSCULAR; INTRAVENOUS
Status: DISCONTINUED | OUTPATIENT
Start: 2025-07-30 | End: 2025-08-02 | Stop reason: HOSPADM

## 2025-07-30 RX ORDER — METOPROLOL SUCCINATE 50 MG/1
100 TABLET, EXTENDED RELEASE ORAL DAILY
Status: DISCONTINUED | OUTPATIENT
Start: 2025-07-31 | End: 2025-08-02 | Stop reason: HOSPADM

## 2025-07-30 RX ORDER — SODIUM CHLORIDE 9 MG/ML
INJECTION, SOLUTION INTRAVENOUS CONTINUOUS
Status: DISCONTINUED | OUTPATIENT
Start: 2025-07-30 | End: 2025-08-01

## 2025-07-30 RX ORDER — LORAZEPAM 1 MG/1
2 TABLET ORAL
Status: DISCONTINUED | OUTPATIENT
Start: 2025-07-30 | End: 2025-07-30

## 2025-07-30 RX ORDER — LORAZEPAM 1 MG/1
1 TABLET ORAL
Status: DISCONTINUED | OUTPATIENT
Start: 2025-07-30 | End: 2025-07-30

## 2025-07-30 RX ORDER — DIAZEPAM 5 MG/1
20 TABLET ORAL
Status: DISCONTINUED | OUTPATIENT
Start: 2025-07-30 | End: 2025-08-02 | Stop reason: HOSPADM

## 2025-07-30 RX ORDER — ROCURONIUM BROMIDE 10 MG/ML
INJECTION, SOLUTION INTRAVENOUS
Status: DISCONTINUED | OUTPATIENT
Start: 2025-07-30 | End: 2025-07-30 | Stop reason: SDUPTHER

## 2025-07-30 RX ORDER — DIAZEPAM 5 MG/1
15 TABLET ORAL
Status: DISCONTINUED | OUTPATIENT
Start: 2025-07-30 | End: 2025-08-02 | Stop reason: HOSPADM

## 2025-07-30 RX ORDER — ACETAMINOPHEN 325 MG/1
650 TABLET ORAL EVERY 6 HOURS PRN
Status: DISCONTINUED | OUTPATIENT
Start: 2025-07-30 | End: 2025-08-02 | Stop reason: HOSPADM

## 2025-07-30 RX ORDER — SODIUM CHLORIDE 0.9 % (FLUSH) 0.9 %
5-40 SYRINGE (ML) INJECTION EVERY 12 HOURS SCHEDULED
Status: DISCONTINUED | OUTPATIENT
Start: 2025-07-30 | End: 2025-08-02 | Stop reason: HOSPADM

## 2025-07-30 RX ORDER — FOLIC ACID 1 MG/1
1 TABLET ORAL DAILY
Status: DISCONTINUED | OUTPATIENT
Start: 2025-07-30 | End: 2025-08-02 | Stop reason: HOSPADM

## 2025-07-30 RX ORDER — CLINDAMYCIN PHOSPHATE 900 MG/50ML
900 INJECTION, SOLUTION INTRAVENOUS ONCE
Status: DISCONTINUED | OUTPATIENT
Start: 2025-07-31 | End: 2025-07-30

## 2025-07-30 RX ORDER — MULTIVITAMIN WITH IRON
1 TABLET ORAL DAILY
Status: DISCONTINUED | OUTPATIENT
Start: 2025-07-30 | End: 2025-08-02 | Stop reason: HOSPADM

## 2025-07-30 RX ORDER — PROPOFOL 10 MG/ML
INJECTION, EMULSION INTRAVENOUS
Status: DISCONTINUED | OUTPATIENT
Start: 2025-07-30 | End: 2025-07-30

## 2025-07-30 RX ORDER — AMLODIPINE BESYLATE 10 MG/1
10 TABLET ORAL DAILY
Status: DISCONTINUED | OUTPATIENT
Start: 2025-07-31 | End: 2025-08-02 | Stop reason: HOSPADM

## 2025-07-30 RX ORDER — MIDAZOLAM HYDROCHLORIDE 1 MG/ML
INJECTION, SOLUTION INTRAMUSCULAR; INTRAVENOUS
Status: DISCONTINUED | OUTPATIENT
Start: 2025-07-30 | End: 2025-07-30 | Stop reason: SDUPTHER

## 2025-07-30 RX ORDER — ACETAMINOPHEN 650 MG/1
650 SUPPOSITORY RECTAL EVERY 6 HOURS PRN
Status: DISCONTINUED | OUTPATIENT
Start: 2025-07-30 | End: 2025-08-02 | Stop reason: HOSPADM

## 2025-07-30 RX ORDER — MORPHINE SULFATE 4 MG/ML
4 INJECTION, SOLUTION INTRAMUSCULAR; INTRAVENOUS
Status: COMPLETED | OUTPATIENT
Start: 2025-07-30 | End: 2025-07-30

## 2025-07-30 RX ORDER — FENTANYL CITRATE 50 UG/ML
25 INJECTION, SOLUTION INTRAMUSCULAR; INTRAVENOUS EVERY 5 MIN PRN
Refills: 0 | Status: DISCONTINUED | OUTPATIENT
Start: 2025-07-30 | End: 2025-07-31 | Stop reason: HOSPADM

## 2025-07-30 RX ORDER — OXYCODONE AND ACETAMINOPHEN 10; 325 MG/1; MG/1
1 TABLET ORAL EVERY 4 HOURS PRN
Refills: 0 | Status: DISCONTINUED | OUTPATIENT
Start: 2025-07-30 | End: 2025-08-02 | Stop reason: HOSPADM

## 2025-07-30 RX ORDER — LORAZEPAM 1 MG/1
4 TABLET ORAL
Status: DISCONTINUED | OUTPATIENT
Start: 2025-07-30 | End: 2025-07-30

## 2025-07-30 RX ORDER — LORAZEPAM 1 MG/1
3 TABLET ORAL
Status: DISCONTINUED | OUTPATIENT
Start: 2025-07-30 | End: 2025-07-30

## 2025-07-30 RX ORDER — ONDANSETRON 4 MG/1
4 TABLET, ORALLY DISINTEGRATING ORAL EVERY 8 HOURS PRN
Status: DISCONTINUED | OUTPATIENT
Start: 2025-07-30 | End: 2025-08-02 | Stop reason: HOSPADM

## 2025-07-30 RX ORDER — GLYCOPYRROLATE 0.2 MG/ML
INJECTION INTRAMUSCULAR; INTRAVENOUS
Status: DISCONTINUED | OUTPATIENT
Start: 2025-07-30 | End: 2025-07-30 | Stop reason: SDUPTHER

## 2025-07-30 RX ORDER — ONDANSETRON 2 MG/ML
4 INJECTION INTRAMUSCULAR; INTRAVENOUS
Status: DISCONTINUED | OUTPATIENT
Start: 2025-07-30 | End: 2025-07-31 | Stop reason: HOSPADM

## 2025-07-30 RX ORDER — DIAZEPAM 10 MG/2ML
20 INJECTION, SOLUTION INTRAMUSCULAR; INTRAVENOUS
Status: DISCONTINUED | OUTPATIENT
Start: 2025-07-30 | End: 2025-08-02 | Stop reason: HOSPADM

## 2025-07-30 RX ORDER — ACETAMINOPHEN 325 MG/1
650 TABLET ORAL
Status: DISCONTINUED | OUTPATIENT
Start: 2025-07-30 | End: 2025-07-31 | Stop reason: HOSPADM

## 2025-07-30 RX ORDER — AMLODIPINE BESYLATE 10 MG/1
10 TABLET ORAL DAILY
COMMUNITY
Start: 2025-06-18

## 2025-07-30 RX ORDER — FENTANYL CITRATE 50 UG/ML
INJECTION, SOLUTION INTRAMUSCULAR; INTRAVENOUS
Status: DISCONTINUED | OUTPATIENT
Start: 2025-07-30 | End: 2025-07-30 | Stop reason: SDUPTHER

## 2025-07-30 RX ORDER — LORAZEPAM 2 MG/ML
4 INJECTION INTRAMUSCULAR
Status: DISCONTINUED | OUTPATIENT
Start: 2025-07-30 | End: 2025-07-30

## 2025-07-30 RX ORDER — PROPOFOL 10 MG/ML
INJECTION, EMULSION INTRAVENOUS
Status: DISCONTINUED | OUTPATIENT
Start: 2025-07-30 | End: 2025-07-30 | Stop reason: SDUPTHER

## 2025-07-30 RX ORDER — POTASSIUM CHLORIDE 1500 MG/1
40 TABLET, EXTENDED RELEASE ORAL PRN
Status: DISCONTINUED | OUTPATIENT
Start: 2025-07-30 | End: 2025-08-02 | Stop reason: HOSPADM

## 2025-07-30 RX ORDER — SODIUM CHLORIDE 9 MG/ML
INJECTION, SOLUTION INTRAVENOUS PRN
Status: DISCONTINUED | OUTPATIENT
Start: 2025-07-30 | End: 2025-08-02 | Stop reason: HOSPADM

## 2025-07-30 RX ORDER — CLINDAMYCIN PHOSPHATE 600 MG/50ML
INJECTION, SOLUTION INTRAVENOUS
Status: DISPENSED
Start: 2025-07-30 | End: 2025-07-31

## 2025-07-30 RX ORDER — METOPROLOL SUCCINATE 100 MG/1
100 TABLET, EXTENDED RELEASE ORAL DAILY
COMMUNITY
Start: 2025-07-03

## 2025-07-30 RX ORDER — DIAZEPAM 10 MG/2ML
10 INJECTION, SOLUTION INTRAMUSCULAR; INTRAVENOUS
Status: DISCONTINUED | OUTPATIENT
Start: 2025-07-30 | End: 2025-08-02 | Stop reason: HOSPADM

## 2025-07-30 RX ORDER — EPHEDRINE SULFATE/0.9% NACL/PF 25 MG/5 ML
SYRINGE (ML) INTRAVENOUS
Status: DISCONTINUED | OUTPATIENT
Start: 2025-07-30 | End: 2025-07-30 | Stop reason: SDUPTHER

## 2025-07-30 RX ORDER — GAUZE BANDAGE 2" X 2"
100 BANDAGE TOPICAL DAILY
Status: DISCONTINUED | OUTPATIENT
Start: 2025-07-31 | End: 2025-08-02 | Stop reason: HOSPADM

## 2025-07-30 RX ORDER — DIAZEPAM 5 MG/1
5 TABLET ORAL
Status: DISCONTINUED | OUTPATIENT
Start: 2025-07-30 | End: 2025-08-02 | Stop reason: HOSPADM

## 2025-07-30 RX ORDER — SODIUM CHLORIDE 0.9 % (FLUSH) 0.9 %
5-40 SYRINGE (ML) INJECTION EVERY 12 HOURS SCHEDULED
Status: DISCONTINUED | OUTPATIENT
Start: 2025-07-30 | End: 2025-07-31 | Stop reason: HOSPADM

## 2025-07-30 RX ORDER — SODIUM CHLORIDE 0.9 % (FLUSH) 0.9 %
5-40 SYRINGE (ML) INJECTION PRN
Status: DISCONTINUED | OUTPATIENT
Start: 2025-07-30 | End: 2025-07-31 | Stop reason: HOSPADM

## 2025-07-30 RX ORDER — POLYETHYLENE GLYCOL 3350 17 G/17G
17 POWDER, FOR SOLUTION ORAL DAILY PRN
Status: DISCONTINUED | OUTPATIENT
Start: 2025-07-30 | End: 2025-08-02 | Stop reason: HOSPADM

## 2025-07-30 RX ORDER — LORAZEPAM 2 MG/ML
2 INJECTION INTRAMUSCULAR
Status: DISCONTINUED | OUTPATIENT
Start: 2025-07-30 | End: 2025-07-30

## 2025-07-30 RX ORDER — OXYCODONE HYDROCHLORIDE 5 MG/1
5 TABLET ORAL
Refills: 0 | Status: DISCONTINUED | OUTPATIENT
Start: 2025-07-30 | End: 2025-07-31 | Stop reason: HOSPADM

## 2025-07-30 RX ORDER — MORPHINE SULFATE 2 MG/ML
2 INJECTION, SOLUTION INTRAMUSCULAR; INTRAVENOUS EVERY 4 HOURS PRN
Refills: 0 | Status: DISCONTINUED | OUTPATIENT
Start: 2025-07-30 | End: 2025-08-02 | Stop reason: HOSPADM

## 2025-07-30 RX ORDER — HYDROMORPHONE HYDROCHLORIDE 2 MG/ML
INJECTION, SOLUTION INTRAMUSCULAR; INTRAVENOUS; SUBCUTANEOUS
Status: DISCONTINUED | OUTPATIENT
Start: 2025-07-30 | End: 2025-07-30 | Stop reason: SDUPTHER

## 2025-07-30 RX ORDER — DEXAMETHASONE 4 MG/1
6 TABLET ORAL ONCE
Status: COMPLETED | OUTPATIENT
Start: 2025-07-30 | End: 2025-07-30

## 2025-07-30 RX ORDER — DIAZEPAM 10 MG/2ML
15 INJECTION, SOLUTION INTRAMUSCULAR; INTRAVENOUS
Status: DISCONTINUED | OUTPATIENT
Start: 2025-07-30 | End: 2025-08-02 | Stop reason: HOSPADM

## 2025-07-30 RX ORDER — MULTIVITAMIN
1 TABLET ORAL DAILY
COMMUNITY

## 2025-07-30 RX ADMIN — PROPOFOL 30 MG: 10 INJECTION, EMULSION INTRAVENOUS at 21:29

## 2025-07-30 RX ADMIN — SODIUM CHLORIDE 600 MG: 9 INJECTION, SOLUTION INTRAVENOUS at 21:15

## 2025-07-30 RX ADMIN — DEXMEDETOMIDINE 4 MCG: 200 INJECTION, SOLUTION INTRAVENOUS at 21:51

## 2025-07-30 RX ADMIN — ROCURONIUM BROMIDE 10 MG: 10 INJECTION, SOLUTION INTRAVENOUS at 21:29

## 2025-07-30 RX ADMIN — ROCURONIUM BROMIDE 5 MG: 10 INJECTION, SOLUTION INTRAVENOUS at 21:05

## 2025-07-30 RX ADMIN — ROCURONIUM BROMIDE 35 MG: 10 INJECTION, SOLUTION INTRAVENOUS at 21:06

## 2025-07-30 RX ADMIN — SODIUM CHLORIDE, PRESERVATIVE FREE 10 ML: 5 INJECTION INTRAVENOUS at 12:25

## 2025-07-30 RX ADMIN — SODIUM CHLORIDE: 0.9 INJECTION, SOLUTION INTRAVENOUS at 12:26

## 2025-07-30 RX ADMIN — HYDROMORPHONE HYDROCHLORIDE 0.2 MG: 2 INJECTION INTRAMUSCULAR; INTRAVENOUS; SUBCUTANEOUS at 22:53

## 2025-07-30 RX ADMIN — DEXMEDETOMIDINE 4 MCG: 200 INJECTION, SOLUTION INTRAVENOUS at 21:32

## 2025-07-30 RX ADMIN — FENTANYL CITRATE 25 MCG: 50 INJECTION INTRAMUSCULAR; INTRAVENOUS at 21:11

## 2025-07-30 RX ADMIN — ROCURONIUM BROMIDE 10 MG: 10 INJECTION, SOLUTION INTRAVENOUS at 22:21

## 2025-07-30 RX ADMIN — FENTANYL CITRATE 25 MCG: 50 INJECTION INTRAMUSCULAR; INTRAVENOUS at 21:28

## 2025-07-30 RX ADMIN — GLYCOPYRROLATE 0.1 MG: 0.2 INJECTION INTRAMUSCULAR; INTRAVENOUS at 22:10

## 2025-07-30 RX ADMIN — LIDOCAINE HYDROCHLORIDE 60 MG: 20 INJECTION, SOLUTION EPIDURAL; INFILTRATION; INTRACAUDAL; PERINEURAL at 21:05

## 2025-07-30 RX ADMIN — DEXAMETHASONE SODIUM PHOSPHATE 4 MG: 4 INJECTION INTRA-ARTICULAR; INTRALESIONAL; INTRAMUSCULAR; INTRAVENOUS; SOFT TISSUE at 21:55

## 2025-07-30 RX ADMIN — LIDOCAINE HYDROCHLORIDE 40 MG: 20 INJECTION, SOLUTION EPIDURAL; INFILTRATION; INTRACAUDAL; PERINEURAL at 23:04

## 2025-07-30 RX ADMIN — HYDROMORPHONE HYDROCHLORIDE 0.2 MG: 2 INJECTION INTRAMUSCULAR; INTRAVENOUS; SUBCUTANEOUS at 21:55

## 2025-07-30 RX ADMIN — PROPOFOL 100 MG: 10 INJECTION, EMULSION INTRAVENOUS at 21:05

## 2025-07-30 RX ADMIN — FENTANYL CITRATE 25 MCG: 50 INJECTION INTRAMUSCULAR; INTRAVENOUS at 21:05

## 2025-07-30 RX ADMIN — MORPHINE SULFATE 4 MG: 4 INJECTION, SOLUTION INTRAMUSCULAR; INTRAVENOUS at 06:15

## 2025-07-30 RX ADMIN — SUGAMMADEX 200 MG: 100 INJECTION, SOLUTION INTRAVENOUS at 22:53

## 2025-07-30 RX ADMIN — MIDAZOLAM 2 MG: 1 INJECTION, SOLUTION INTRAMUSCULAR; INTRAVENOUS at 20:56

## 2025-07-30 RX ADMIN — CEFAZOLIN 2000 MG: 1 INJECTION, POWDER, FOR SOLUTION INTRAMUSCULAR; INTRAVENOUS at 16:25

## 2025-07-30 RX ADMIN — EPHEDRINE SULFATE 5 MG: 5 INJECTION INTRAVENOUS at 22:12

## 2025-07-30 RX ADMIN — KETOROLAC TROMETHAMINE 15 MG: 15 INJECTION, SOLUTION INTRAMUSCULAR; INTRAVENOUS at 22:49

## 2025-07-30 RX ADMIN — ONDANSETRON 4 MG: 2 INJECTION, SOLUTION INTRAMUSCULAR; INTRAVENOUS at 22:49

## 2025-07-30 RX ADMIN — DEXAMETHASONE 6 MG: 4 TABLET ORAL at 04:28

## 2025-07-30 RX ADMIN — PROPOFOL 50 MG: 10 INJECTION, EMULSION INTRAVENOUS at 22:21

## 2025-07-30 RX ADMIN — FENTANYL CITRATE 25 MCG: 50 INJECTION INTRAMUSCULAR; INTRAVENOUS at 21:49

## 2025-07-30 ASSESSMENT — PAIN DESCRIPTION - ORIENTATION
ORIENTATION: LEFT
ORIENTATION: LEFT

## 2025-07-30 ASSESSMENT — PAIN - FUNCTIONAL ASSESSMENT
PAIN_FUNCTIONAL_ASSESSMENT: PREVENTS OR INTERFERES SOME ACTIVE ACTIVITIES AND ADLS
PAIN_FUNCTIONAL_ASSESSMENT: 0-10
PAIN_FUNCTIONAL_ASSESSMENT: PREVENTS OR INTERFERES SOME ACTIVE ACTIVITIES AND ADLS

## 2025-07-30 ASSESSMENT — PAIN SCALES - GENERAL
PAINLEVEL_OUTOF10: 0
PAINLEVEL_OUTOF10: 0
PAINLEVEL_OUTOF10: 6
PAINLEVEL_OUTOF10: 0
PAINLEVEL_OUTOF10: 8
PAINLEVEL_OUTOF10: 0

## 2025-07-30 ASSESSMENT — LIFESTYLE VARIABLES: SMOKING_STATUS: 1

## 2025-07-30 ASSESSMENT — PAIN DESCRIPTION - PAIN TYPE: TYPE: ACUTE PAIN

## 2025-07-30 ASSESSMENT — PAIN DESCRIPTION - LOCATION
LOCATION: LEG
LOCATION: LEG

## 2025-07-30 ASSESSMENT — PAIN SCALES - WONG BAKER: WONGBAKER_NUMERICALRESPONSE: NO HURT

## 2025-07-30 ASSESSMENT — PAIN DESCRIPTION - DESCRIPTORS
DESCRIPTORS: ACHING
DESCRIPTORS: ACHING

## 2025-07-30 NOTE — ED NOTES
TRANSFER - OUT REPORT:    Verbal report given to Aliya VILLASENOR on Vicki Garza  being transferred to [facility] for routine progression of patient care       Report consisted of patient's Situation, Background, Assessment and   Recommendations(SBAR).     Information from the following report(s) Nurse Handoff Report was reviewed with the receiving nurse.    [place] Assessment:    Presents to emergency department  because of falls (Syncope, seizure, or loss of consciousness): No  Age > 70: No  Altered Mental Status, Intoxication with alcohol or substance confusion (Disorientation, impaired judgment, poor safety awaremess, or inability to follow instructions): No  Impaired Mobility: Ambulates or transfers with assistive devices or assistance; Unable to ambulate or transer.: Yes             Lines:   Peripheral IV 07/30/25 Left;Proximal;Anterior Forearm (Active)        Opportunity for questions and clarification was provided.      Patient transported with:  Tech

## 2025-07-30 NOTE — ED NOTES
Patient resting on stretcher in no distress, informed of probable OR time of 6pm, patient adjusted in bed, no other needs at this time.

## 2025-07-30 NOTE — ED PROVIDER NOTES
ED Course as of 07/30/25 0817   Wed Jul 30, 2025   0503 Looked at x-ray patient has a possible nondisplaced proximal tibial midshaft fracture waiting for radiology official read  She was given Decadron and lidocaine patch we will have her ambulate   [MI]   0559 Discussed case with Dr. Arturo Lilly he said to admit to medicine and make patient n.p.o. chest x-ray basic blood work. [MI]   0610 Patient has been put in a posterior splint patient is neurovascularly intact before splint application she was given for morphine for pain and neurovascularly intact afterwards splint applied by tech [MI]   0625 EKG sinus rhythm normal axis normal intervals at 75 [MI]   0720 MI to JG- 65 year old female here with tibial fracture, to be admitted to medicine once results received. Patient without distress, labs have returned.  Page placed. [JG]   4730 Dr. Elliott notes day hospitalist will take care of this pt [JG]   8313 Admit med surg per Dr. Simon [JG]      ED Course User Index  [JG] Marifer Connolly DO  [MI] Lai Weir MD Gillman, Jessica, DO  07/30/25 0817

## 2025-07-30 NOTE — ED NOTES
Patient resting on stretcher, aware is NPO, states she had  a few sips of water earlier this morning. Patient denies pain at this time, encouraged to rest, no other needs at this time.

## 2025-07-30 NOTE — ED NOTES
EMERGENCY DEPARTMENT HISTORY AND PHYSICAL EXAM      Date: 7/30/2025  Patient Name: Vicki Garza    History of Presenting Illness     Chief Complaint   Patient presents with    Leg Pain       History (Context): Vicki Garza is a 65 y.o. female  presents to the ED today with chief complaint of left leg pain. Pt states pain started 10 days ago. Pain is present inferior to left kneecap. States in the beginning she could limp to walk, which proceeded to using her mother's walker. Today she was unable to walk, which brought her to ED. Denies hx of trauma, fever, unexplained weight loss. Medical hx significant for HTN and anxiety.      PCP: Aroldo Hilario MD    No current facility-administered medications for this encounter.     Current Outpatient Medications   Medication Sig Dispense Refill    amoxicillin (AMOXIL) 500 MG capsule TAKE ONE CAPSULE BY MOUTH THREE TIMES DAILY      benazepril (LOTENSIN) 20 MG tablet Take 20 mg by mouth daily      escitalopram (LEXAPRO) 10 MG tablet Take 10 mg by mouth daily      ibuprofen (ADVIL;MOTRIN) 800 MG tablet TAKE 1 TABLET BY MOUTH THREE TIMES DAILY AS NEEDED FOR PAIN      LORazepam (ATIVAN) 0.5 MG tablet Take 0.5 mg by mouth 2 times daily.      LORazepam (ATIVAN) 1 MG tablet Take 1 mg by mouth every 8 hours as needed.         Past History     Past Medical History:   Past Medical History:   Diagnosis Date    Hypertension        Past Surgical History:  No past surgical history on file.    Family History:  No family history on file.    Social History:   Social History     Tobacco Use    Smoking status: Light Smoker    Smokeless tobacco: Never   Substance Use Topics    Drug use: No       Allergies:  No Known Allergies      Physical Exam     Vitals:    07/30/25 0331 07/30/25 0345   BP: (!) 146/86 139/80   Pulse: 88 81   Resp: 16    Temp: 97.8 °F (36.6 °C)    TempSrc: Oral    SpO2: 99% 95%   Weight: 52.2 kg (115 lb)    Height: 1.6 m (5' 3\")        Physical Exam  Constitutional:

## 2025-07-30 NOTE — ED NOTES
Assumed care of patient, report received from JACKLYN Montemayor. Patient resting comfortably in no distress.

## 2025-07-30 NOTE — H&P
Hospitalist Admission History and Physical    NAME:  Vicki Garza   :   1960   MRN:   [id number]     PCP:  Aroldo Hilario MD  Date/Time:  2025 8:29 AM    Subjective:   CHIEF COMPLAINT:  Left leg pain     HISTORY OF PRESENT ILLNESS:     Vicki is a 65 y.o.   female with PMH of hypertension and tobacco abuse who presents with left leg pain progressively worsening over the last 1.5 weeks prompting presentation to the ED.  Patient denies any recent falls states she has has had a worsening limp over the last 3 weeks or so and she believes fracture to her left leg was a result of this limp, denies any history or knowledge of osteoporosis, brittle bones or cancer history, no falls.  She shares she has been using a walker to assist with ambulation long term.  Endorses drinking 3-4 beers daily over the last 1 to 2 years and smoking about half pack of cigarettes daily, denies drug abuse.  She indicates she does not have any pain currently in bed however has significant pain prior to ER in setting of weightbearing.  She is without other concerns currently including no shortness of breath or current pain concerns.  She does note she has been taking ibuprofen a few times a day for the last 1 to 2 weeks since this pain started and has not been taking with food.    Patient Active Problem List   Diagnosis    Essential hypertension    Stress    S/p tibial fracture       Past Medical History:   Diagnosis Date    Hypertension        History reviewed. No pertinent surgical history.    Social History     Tobacco Use    Smoking status: Light Smoker    Smokeless tobacco: Never   Substance Use Topics    Alcohol use: Not on file        History reviewed. No pertinent family history.     No Known Allergies     Prior to Admission Medications   Prescriptions Last Dose Informant Patient Reported? Taking?   LORazepam (ATIVAN) 0.5 MG tablet   Yes No   Sig: Take 0.5 mg by mouth 2 times daily.   LORazepam (ATIVAN) 1 MG tablet

## 2025-07-30 NOTE — ED NOTES
Long leg splint placed by STEVE Rader. Sensation is intact. Pt is able to move her toes. Capillary refill is <3 seconds. Dr. Weir made aware.

## 2025-07-30 NOTE — PROGRESS NOTES
[facility] HEALTH  [facility]                  Room # ER07/07    Name: Vicki Garza           Age: 65 y.o.    Gender: female          MRN: [id number]  Mandaen: Voodoo       Preferred Language: English    Date: 07/30/25  Visit Time: Begin Time: 0800 End Time : 0812 Complexity of Encounter: Moderate      Visit Summary: Patient was in bed awaiting to be taken to surgery to fix her leg.There was no one present with her..  introduced himself. . Patient shared about her accident and her health issues as well as her hopes for the short recovery. Patient expressed comfort in her dana community for extra help in the healing process. Patient is Voodoo in a local [employer] orthodox community   offered prayer since she was going to surgery today.  Referral/Consult From: Rounding (tibial fracture)  Encounter Overview/Reason: Initial Encounter, Pre-Op ([id number])  Encounter Code: Encounter Code:  Assessment by  services   Crisis (if applicable): Type: Follow up (tibial fracture)  Service Provided For: Patient     Patient was available.    Dana, Belief, Meaning:   Patient identifies as spiritual  is connected with a dana tradition or spiritual practice  has beliefs or practices that help with coping during difficult times  Family/Friends No family/friends present  Rituals (if applicable)      Importance and Influence:  Patient has spiritual/personal beliefs that influence decisions regarding their health  Family/Friends No family/friends present    Community:  Patient   is connected with a spiritual community  indicated that they feel well-supported  Family/Friends   is connected with a spiritual community  indicated that they feel well-supported    Assessment and Plan of Care:   Emotions Expressed by Patient:   Assessment: Calm, Coping, Hopeful    Interventions by :   Intervention: Active listening     Result/ Response by Patient:   Outcome: Comfort, Coping, Peace    Patient Plan

## 2025-07-30 NOTE — PROGRESS NOTES
Request for admission at 7:23 AM.  Patient with tibial fracture.  Dr. Lilly with orthopedic surgery is asked us to admit patient and keep NPO.    I let ER physician know that daytime physicians would be alerted of pending admissions.

## 2025-07-30 NOTE — ED NOTES
Purewick leaked, replaced, brief and chux pad placed, patient asked this RN to throw away mesh shorts and underwear.

## 2025-07-30 NOTE — ED TRIAGE NOTES
Patient comes in for left leg pain that has been going on for about a week and a half. Patient states that the pain has been getting worse over the last several days. Patient denies any specific injury.

## 2025-07-30 NOTE — ED NOTES
ACE wrap applied. Pedal pulses bilaterally were 2+. Skin color was appropriate for ethnicity. Sensation was intact.

## 2025-07-30 NOTE — PROGRESS NOTES
I had a lengthy discussion, with this patient's .  Vicki Garza has a left tibia proximal tibia fracture.  This occurred atraumatically as she has had pain for 7 to 10 days now.  X-rays, on close inspection of her left tibia, confirmed a nondisplaced nonangulated left proximal tibia fracture , near the metaphyseal diaphyseal junction.      The patient is a pleasant 65 y.o. female who has a history of 10-day onset, worsening pain discomfort, the left the proximal one third of her tibia.  She denies any fever shakes chills night sweats or any history of trauma, no history of glucocorticosteroid prolonged consumption and no history of osteoporosis.  Because she was unable to walk, she presented to [facility] emergency room.  X-rays of her left tibia, full-length, show a nondisplaced Nonangulated left proximal tibia fracture as well as a healed remote left distal fibula fracture.     Due to the current findings, affected activity of daily living and continued pain and discomfort, surgical intervention is indicated. The alternatives, risks, and complications, including but not limited to infection, blood loss, need for blood transfusion, neurovascular damage, linda-incisional numbness, subcutaneous hematoma, bone fracture, anesthetic complications, DVT, PE, death, RSD, postoperative stiffness and pain, possible surgical scar, delayed healing and nonhealing, reflexive sympathetic dystrophy, damage to blood vessels and nerves, need for more surgery, MI, and stroke,  failure of hardware, gait disturbances, limb length discrepancy, prosthesis longevity, recurrence of fracture propagation, delayed healing nonhealing, nonunion and also the rare event of fat embolism syndrome.  All of these have been specifically discussed with individual, in full detail, and a shared, informed decision was made at this time, for surgical intervention having failed all conservative measures. The patient understands and  wishes to proceed with surgery.    Treatment options include long-leg casting, nonweightbearing manage for transfers, or surgical invention would include intramedullary rodding stabilization of this fracture.  I spoke with patient's  recommendations for intramedullary rodding stabilizing this fracture in order to allow her to weight-bear this much sooner and to help stabilize her fracture to minimize her from moving.  He is in agreement with this.  I will talk to her later this today, regarding her diagnosis.  The [facility] emergency room team was gracious to bring the telephone to this patient's family so I can talk to this patient's .      Surgical intervention: Indicated: Yes   NPO: Continue n.p.o. no   3    Preoperative  Antibiotics on call to OR  4.   Consents: x consent Procedure placed in Epic Order    x anesthesia Consent pending      I will see her later today.      SAURABH Lilly MD  7/30/2025 6:40 AM

## 2025-07-30 NOTE — PLAN OF CARE
Problem: Discharge Planning  Goal: Discharge to home or other facility with appropriate resources  Outcome: Progressing     Problem: Safety - Adult  Goal: Free from fall injury  Outcome: Progressing     Problem: ABCDS Injury Assessment  Goal: Absence of physical injury  Outcome: Progressing     Problem: Pain  Goal: Verbalizes/displays adequate comfort level or baseline comfort level  Outcome: Progressing     Problem: Skin/Tissue Integrity - Adult  Goal: Skin integrity remains intact  Outcome: Progressing     Problem: Musculoskeletal - Adult  Goal: Return mobility to safest level of function  Outcome: Progressing

## 2025-07-30 NOTE — CONSULTS
ORTHOPAEDIC CONSULTATION      Patient: Vicki Garza                   MRN: [id number]         SSN: [id number]  YOB: 1960            AGE: 65 y.o.          SEX: female  Date of examination: 7/30/2025     Diagnosis Left tibial stress fracture   Planned Surgery Intramedullary rodding, left tibia shaft fracture   Surgeon SAURABH Lilly MD   Surgery Date 7/30/2025    Anesthesia General   Location Surgery [facility]   Patient Position Supine, Alec table   Case Type Elective   Consults Admit to [facility]   Surgical Time 75 minutes   Equipment SYNTHES SUPRA PATELLA IM [place]ON   Admit In Patient -   Anticoagulation Present currently no   CPT CODES 89995         ASSESSMENT/PLAN      I had a lengthy discussion, with this patient's .  This patient has a left tibia proximal tibia fracture.  This occurred atraumatically, she has had pain for 7 to 10 days now.  X-rays, close inspection of her left tibia, confirmed a nondisplaced nonangulated left proximal tibia fracture , near the metaphyseal diaphyseal junction.      Treatment options include long-leg casting, nonweightbearing manage for transfers, or surgical invention would include intramedullary rodding stabilization of this fracture.  I spoke with patient's  recommendations for intramedullary rodding stabilizing this fracture in order to allow her to weight-bear this much sooner and to help stabilize her fracture to minimize her from moving.  He is in agreement with this.  I will talk to her later this today, regarding her diagnosis.  The [facility] emergency room team was gracious to bring the telephone to this patient's family so I can talk to this patient's .      Surgical intervention: Indicated: Yes   NPO: Continue n.p.o. no   3    Preoperative  Antibiotics on call to OR  4.   Consents: x consent Procedure placed in Epic Order    x anesthesia Consent pending         HISTORY AND INFORMED CONSENT      The  MEDICAL HISTORY:   Past Medical History:   Diagnosis Date    Hypertension      PAST SURGICAL HISTORY: History reviewed. No pertinent surgical history.  ALLERGIES: No Known Allergies   FAMILY HISTORY: History reviewed. No pertinent family history.  SOCIAL HISTORY:   Social History     Socioeconomic History    Marital status:      Spouse name: None    Number of children: None    Years of education: None    Highest education level: None   Tobacco Use    Smoking status: Light Smoker    Smokeless tobacco: Never   Substance and Sexual Activity    Drug use: No     Social Drivers of Health     Food Insecurity: No Food Insecurity (7/30/2025)    Hunger Vital Sign     Worried About Running Out of Food in the Last Year: Never true     Ran Out of Food in the Last Year: Never true   Transportation Needs: No Transportation Needs (7/30/2025)    PRAPARE - Transportation     Lack of Transportation (Medical): No     Lack of Transportation (Non-Medical): No   Housing Stability: Low Risk  (7/30/2025)    Housing Stability Vital Sign     Unable to Pay for Housing in the Last Year: No     Number of Times Moved in the Last Year: 0     Homeless in the Last Year: No       CURRENT MEDICATIONS:  A list of medications prior to the time of admission include:  Prior to Admission medications    Medication Sig Start Date End Date Taking? Authorizing Provider   metoprolol succinate (TOPROL XL) 100 MG extended release tablet Take 1 tablet by mouth daily 7/3/25  Yes Provider, MD Laurie   Multiple Vitamin (DAILY VITES) TABS Take 1 tablet by mouth daily   Yes Provider, MD Laurie   amLODIPine (NORVASC) 10 MG tablet Take 1 tablet by mouth daily 6/18/25  Yes Provider, MD aLurie   escitalopram (LEXAPRO) 10 MG tablet Take 1 tablet by mouth daily 5/7/20  Yes Automatic Reconciliation, Ar   ibuprofen (ADVIL;MOTRIN) 800 MG tablet TAKE 1 TABLET BY MOUTH THREE TIMES DAILY AS NEEDED FOR PAIN 9/3/19  Yes Automatic Reconciliation, Ar   LORazepam

## 2025-07-31 LAB
25(OH)D3 SERPL-MCNC: 37.7 NG/ML (ref 30–100)
ANION GAP SERPL CALC-SCNC: 12 MMOL/L (ref 3–18)
BASOPHILS # BLD: 0.02 K/UL (ref 0–0.1)
BASOPHILS NFR BLD: 0.1 % (ref 0–2)
BUN SERPL-MCNC: 12 MG/DL (ref 6–23)
BUN/CREAT SERPL: 30 (ref 12–20)
CALCIUM SERPL-MCNC: 8.9 MG/DL (ref 8.5–10.1)
CALCIUM SERPL-MCNC: 9.3 MG/DL (ref 8.5–10.1)
CHLORIDE SERPL-SCNC: 104 MMOL/L (ref 98–107)
CO2 SERPL-SCNC: 19 MMOL/L (ref 21–32)
CREAT SERPL-MCNC: 0.38 MG/DL (ref 0.6–1.3)
DIFFERENTIAL METHOD BLD: ABNORMAL
EOSINOPHIL # BLD: 0.01 K/UL (ref 0–0.4)
EOSINOPHIL NFR BLD: 0 % (ref 0–5)
ERYTHROCYTE [DISTWIDTH] IN BLOOD BY AUTOMATED COUNT: 11.3 % (ref 11.6–14.5)
GLUCOSE SERPL-MCNC: 118 MG/DL (ref 74–108)
HCT VFR BLD AUTO: 36.8 % (ref 35–45)
HGB BLD-MCNC: 12.3 G/DL (ref 12–16)
IMM GRANULOCYTES # BLD AUTO: 0.1 K/UL (ref 0–0.04)
IMM GRANULOCYTES NFR BLD AUTO: 0.5 % (ref 0–0.5)
LYMPHOCYTES # BLD: 0.89 K/UL (ref 0.9–3.6)
LYMPHOCYTES NFR BLD: 4.4 % (ref 21–52)
MCH RBC QN AUTO: 34.2 PG (ref 24–34)
MCHC RBC AUTO-ENTMCNC: 33.4 G/DL (ref 31–37)
MCV RBC AUTO: 102.2 FL (ref 78–100)
MONOCYTES # BLD: 1.07 K/UL (ref 0.05–1.2)
MONOCYTES NFR BLD: 5.3 % (ref 3–10)
NEUTS SEG # BLD: 17.97 K/UL (ref 1.8–8)
NEUTS SEG NFR BLD: 89.7 % (ref 40–73)
NRBC # BLD: 0 K/UL (ref 0–0.01)
NRBC BLD-RTO: 0 PER 100 WBC
PHOSPHATE SERPL-MCNC: 3.3 MG/DL (ref 2.5–4.9)
PLATELET # BLD AUTO: 347 K/UL (ref 135–420)
PMV BLD AUTO: 9.6 FL (ref 9.2–11.8)
POTASSIUM SERPL-SCNC: 3.4 MMOL/L (ref 3.5–5.5)
PTH-INTACT SERPL-MCNC: 18.9 PG/ML (ref 15–65)
RBC # BLD AUTO: 3.6 M/UL (ref 4.2–5.3)
SODIUM SERPL-SCNC: 135 MMOL/L (ref 136–145)
WBC # BLD AUTO: 20.1 K/UL (ref 4.6–13.2)

## 2025-07-31 PROCEDURE — 6370000000 HC RX 637 (ALT 250 FOR IP): Performed by: ORTHOPAEDIC SURGERY

## 2025-07-31 PROCEDURE — 97535 SELF CARE MNGMENT TRAINING: CPT

## 2025-07-31 PROCEDURE — 1100000000 HC RM PRIVATE

## 2025-07-31 PROCEDURE — 6360000002 HC RX W HCPCS: Performed by: ORTHOPAEDIC SURGERY

## 2025-07-31 PROCEDURE — 84100 ASSAY OF PHOSPHORUS: CPT

## 2025-07-31 PROCEDURE — 94761 N-INVAS EAR/PLS OXIMETRY MLT: CPT

## 2025-07-31 PROCEDURE — 36415 COLL VENOUS BLD VENIPUNCTURE: CPT

## 2025-07-31 PROCEDURE — 83970 ASSAY OF PARATHORMONE: CPT

## 2025-07-31 PROCEDURE — 99024 POSTOP FOLLOW-UP VISIT: CPT | Performed by: ORTHOPAEDIC SURGERY

## 2025-07-31 PROCEDURE — 6370000000 HC RX 637 (ALT 250 FOR IP): Performed by: NURSE PRACTITIONER

## 2025-07-31 PROCEDURE — 97165 OT EVAL LOW COMPLEX 30 MIN: CPT

## 2025-07-31 PROCEDURE — 82306 VITAMIN D 25 HYDROXY: CPT

## 2025-07-31 PROCEDURE — 2500000003 HC RX 250 WO HCPCS: Performed by: NURSE PRACTITIONER

## 2025-07-31 PROCEDURE — 80048 BASIC METABOLIC PNL TOTAL CA: CPT

## 2025-07-31 PROCEDURE — 85025 COMPLETE CBC W/AUTO DIFF WBC: CPT

## 2025-07-31 PROCEDURE — 99232 SBSQ HOSP IP/OBS MODERATE 35: CPT | Performed by: NURSE PRACTITIONER

## 2025-07-31 PROCEDURE — 97162 PT EVAL MOD COMPLEX 30 MIN: CPT

## 2025-07-31 PROCEDURE — 97530 THERAPEUTIC ACTIVITIES: CPT

## 2025-07-31 RX ORDER — ENOXAPARIN SODIUM 100 MG/ML
40 INJECTION SUBCUTANEOUS DAILY
Status: DISCONTINUED | OUTPATIENT
Start: 2025-07-31 | End: 2025-08-02 | Stop reason: HOSPADM

## 2025-07-31 RX ADMIN — HYDROCODONE BITARTRATE AND ACETAMINOPHEN 2 TABLET: 5; 325 TABLET ORAL at 17:32

## 2025-07-31 RX ADMIN — ACETAMINOPHEN 650 MG: 325 TABLET ORAL at 00:20

## 2025-07-31 RX ADMIN — CLINDAMYCIN PHOSPHATE 600 MG: 600 INJECTION, SOLUTION INTRAVENOUS at 05:12

## 2025-07-31 RX ADMIN — ACETAMINOPHEN 650 MG: 325 TABLET ORAL at 12:20

## 2025-07-31 RX ADMIN — CLINDAMYCIN PHOSPHATE 600 MG: 600 INJECTION, SOLUTION INTRAVENOUS at 12:14

## 2025-07-31 RX ADMIN — FOLIC ACID 1 MG: 1 TABLET ORAL at 08:50

## 2025-07-31 RX ADMIN — AMLODIPINE BESYLATE 10 MG: 10 TABLET ORAL at 08:50

## 2025-07-31 RX ADMIN — HYDROCODONE BITARTRATE AND ACETAMINOPHEN 2 TABLET: 5; 325 TABLET ORAL at 08:50

## 2025-07-31 RX ADMIN — SODIUM CHLORIDE, PRESERVATIVE FREE 10 ML: 5 INJECTION INTRAVENOUS at 08:51

## 2025-07-31 RX ADMIN — SODIUM CHLORIDE, PRESERVATIVE FREE 10 ML: 5 INJECTION INTRAVENOUS at 08:53

## 2025-07-31 RX ADMIN — SODIUM CHLORIDE, PRESERVATIVE FREE 10 ML: 5 INJECTION INTRAVENOUS at 08:52

## 2025-07-31 RX ADMIN — METOPROLOL SUCCINATE 100 MG: 50 TABLET, EXTENDED RELEASE ORAL at 08:50

## 2025-07-31 RX ADMIN — ENOXAPARIN SODIUM 40 MG: 100 INJECTION SUBCUTANEOUS at 21:24

## 2025-07-31 RX ADMIN — CLINDAMYCIN PHOSPHATE 600 MG: 600 INJECTION, SOLUTION INTRAVENOUS at 21:35

## 2025-07-31 RX ADMIN — ACETAMINOPHEN 650 MG: 325 TABLET ORAL at 05:39

## 2025-07-31 RX ADMIN — ACETAMINOPHEN 650 MG: 325 TABLET ORAL at 21:31

## 2025-07-31 RX ADMIN — THERA TABS 1 TABLET: TAB at 08:50

## 2025-07-31 RX ADMIN — Medication 100 MG: at 08:50

## 2025-07-31 RX ADMIN — ESCITALOPRAM 10 MG: 10 TABLET, FILM COATED ORAL at 08:50

## 2025-07-31 ASSESSMENT — PAIN DESCRIPTION - ORIENTATION
ORIENTATION: LEFT

## 2025-07-31 ASSESSMENT — PAIN DESCRIPTION - ONSET
ONSET: ON-GOING
ONSET: GRADUAL
ONSET: ON-GOING

## 2025-07-31 ASSESSMENT — PAIN DESCRIPTION - PAIN TYPE
TYPE: SURGICAL PAIN
TYPE: SURGICAL PAIN
TYPE: ACUTE PAIN
TYPE: SURGICAL PAIN
TYPE: SURGICAL PAIN

## 2025-07-31 ASSESSMENT — PAIN DESCRIPTION - LOCATION
LOCATION: KNEE
LOCATION: KNEE;LEG
LOCATION: KNEE;LEG
LOCATION: LEG
LOCATION: LEG;KNEE
LOCATION: KNEE

## 2025-07-31 ASSESSMENT — PAIN SCALES - WONG BAKER
WONGBAKER_NUMERICALRESPONSE: NO HURT
WONGBAKER_NUMERICALRESPONSE: NO HURT
WONGBAKER_NUMERICALRESPONSE: HURTS A LITTLE BIT
WONGBAKER_NUMERICALRESPONSE: NO HURT
WONGBAKER_NUMERICALRESPONSE: NO HURT

## 2025-07-31 ASSESSMENT — PAIN DESCRIPTION - DESCRIPTORS
DESCRIPTORS: ACHING

## 2025-07-31 ASSESSMENT — PAIN SCALES - GENERAL
PAINLEVEL_OUTOF10: 4
PAINLEVEL_OUTOF10: 0
PAINLEVEL_OUTOF10: 2
PAINLEVEL_OUTOF10: 2
PAINLEVEL_OUTOF10: 0
PAINLEVEL_OUTOF10: 6
PAINLEVEL_OUTOF10: 0
PAINLEVEL_OUTOF10: 4
PAINLEVEL_OUTOF10: 6
PAINLEVEL_OUTOF10: 3
PAINLEVEL_OUTOF10: 5

## 2025-07-31 ASSESSMENT — PAIN DESCRIPTION - FREQUENCY
FREQUENCY: INTERMITTENT

## 2025-07-31 ASSESSMENT — PAIN - FUNCTIONAL ASSESSMENT
PAIN_FUNCTIONAL_ASSESSMENT: ACTIVITIES ARE NOT PREVENTED

## 2025-07-31 ASSESSMENT — PAIN DESCRIPTION - DIRECTION: RADIATING_TOWARDS: NO

## 2025-07-31 NOTE — PLAN OF CARE
Problem: Discharge Planning  Goal: Discharge to home or other facility with appropriate resources  7/31/2025 0052 by Becyk Villegas RN  Outcome: Progressing  7/30/2025 1520 by Aliya Carey RN  Outcome: Progressing     Problem: Safety - Adult  Goal: Free from fall injury  7/31/2025 0052 by Becky Villegas RN  Outcome: Progressing  Note: No falls  7/30/2025 1520 by Aliya Carey RN  Outcome: Progressing     Problem: ABCDS Injury Assessment  Goal: Absence of physical injury  7/31/2025 0052 by Becky Villegas RN  Outcome: Progressing  7/30/2025 1520 by Aliya Carey RN  Outcome: Progressing     Problem: Pain  Goal: Verbalizes/displays adequate comfort level or baseline comfort level  7/31/2025 0052 by Becky Villegas RN  Outcome: Progressing  Note: Pain will be managed and controlled with a pain level of 4 /10 or less. Patient educated on notifying nurse if pain increases above 3.   7/30/2025 1520 by Aliya Carey RN  Outcome: Progressing     Problem: Skin/Tissue Integrity - Adult  Goal: Skin integrity remains intact  7/31/2025 0052 by Becky Villegas RN  Outcome: Progressing  7/30/2025 1520 by Aliya Carey RN  Outcome: Progressing     Problem: Musculoskeletal - Adult  Goal: Return mobility to safest level of function  7/31/2025 0052 by Becky Villegas RN  Outcome: Progressing  7/30/2025 1520 by Aliya Carey RN  Outcome: Progressing     Problem: Seizure Precautions  Goal: Remains free of injury related to seizures activity  Outcome: Progressing

## 2025-07-31 NOTE — PERIOP NOTE
Received pt. From OR via bed. Connected to monitors. Vitals stable. Alert and oriented denies any pain or discomfort. Left leg in brace and drsg. Dry and intact.

## 2025-07-31 NOTE — PROGRESS NOTES
Patient: Vicki Garza               Sex: female          DOA: 7/30/2025  YOB: 1960      Age:  65 y.o.        LOS:  LOS: 1 day       S/P  Procedure(s):  INTRAMEDULLARY RODDING LEFT TIBIA SHAFT FRACTURE; [DEPUY SYNTHES ORTHO]; C-ARM; FLAT JENA; SYNTHES SUPRAPATELLAR NAIL               SUBJECTIVE     Reports doing quite well, pain is controlled with Vicodin    Denies cp, sob, abd pain     OBJECTIVE      Blood pressure 132/78, pulse 73, temperature 97.9 °F (36.6 °C), resp. rate 16, height 1.6 m (5' 3\"), weight 53 kg (116 lb 13.5 oz), SpO2 97%.   Well developed, Well Nourished alert, cooperative, and no distress, with a male   Orthopedic location: Left lower extremity  Incision , dressing in place incision on inspected at this time  Skin condition: Visible portions of her skin, thigh and toes are intact  Sensory yes is intact   Motor yes is intact: As relates ankle dorsiflexion, plantarflexion, inversion eversion.  NV intact  2+distal pulses  Neg calf tenderness    LABS     Lab Results   Component Value Date    WBC 20.1 (H) 07/31/2025    HGB 12.3 07/31/2025    HCT 36.8 07/31/2025    .2 (H) 07/31/2025     07/31/2025     Lab Results   Component Value Date     (L) 07/31/2025    K 3.4 (L) 07/31/2025     07/31/2025    CO2 19 (L) 07/31/2025    BUN 12 07/31/2025    CREATININE 0.38 (L) 07/31/2025    GLUCOSE 118 (H) 07/31/2025    CALCIUM 8.9 07/31/2025    BILITOT 0.4 07/30/2025    ALKPHOS 97 07/30/2025    AST 14 07/30/2025    ALT 10 07/30/2025    LABGLOM >90 07/31/2025    GFRAA >60 05/10/2020    AGRATIO 1.2 05/10/2020    GLOB 3.3 07/30/2025        Lab Results   Component Value Date    INR 0.9 07/30/2025    INR 0.9 07/30/2025    PROTIME 12.8 07/30/2025    PROTIME 12.8 07/30/2025        ASSESSMENT AND PLAN     Principal Problem:    S/p tibial fracture  Active Problems:    Left leg pain    Closed fracture of left proximal tibia  Resolved Problems:    * No resolved

## 2025-07-31 NOTE — ANESTHESIA POSTPROCEDURE EVALUATION
Department of Anesthesiology  Postprocedure Note    Patient: Vicki Garza  MRN: [id number]  YOB: 1960  Date of evaluation: 7/30/2025    Procedure Summary       Date: 07/30/25 Room / Location: [facility] MAIN 07 / [facility] MAIN OR    Anesthesia Start: 2056 Anesthesia Stop: 2317    Procedure: INTRAMEDULLARY RODDING LEFT TIBIA SHAFT FRACTURE; [DEPUY SYNTHES ORTHO]; C-ARM; FLAT JENA; SYNTHES SUPRAPATELLAR NAIL (Left: Leg Upper) Diagnosis:       Closed nondisplaced fracture of left tibial tuberosity, initial encounter      (Closed nondisplaced fracture of left tibial tuberosity, initial encounter [S82.155A])    Surgeons: Arturo Lilly MD Responsible Provider: Konstantin Strauss MD    Anesthesia Type: General ASA Status: 2            Anesthesia Type: General    Gay Phase I: Agy Score: 10    Gay Phase II:      Anesthesia Post Evaluation    Patient location during evaluation: PACU  Patient participation: complete - patient participated  Level of consciousness: awake and alert  Pain score: 2  Airway patency: patent  Nausea & Vomiting: no nausea and no vomiting  Cardiovascular status: hemodynamically stable  Respiratory status: room air  Hydration status: euvolemic  Pain management: adequate    No notable events documented.

## 2025-07-31 NOTE — PROGRESS NOTES
[facility] Hospitalist Group  Progress Note    Patient: Vicki Garza Age: 65 y.o. : 1960 MR#: [id number] SSN: [id number]  Date: 2025     Subjective:     Pain control reports 3/10 currently to left leg, no cough / SOB or pain with urination     Assessment/Plan:   Left tibial fracture s/p intramedullary vu and nailing on 2025 - surgical intervention per Dr. Lilly; pain controlled, cont perioperative abx, PT / OT following - DME ordered for home  Tobacco abuse - 1/2 PPD prior to admission, defers nicotine patch at this time. Counseled on cessation   Mild leukocytosis - no clinical evidence of infection based on presentation, reactive vs r/t steroids given in ED  Daily alcohol use - add CIWA with supplements, counseled on cessation; no evidence of withdrawal at present  Anxiety / depression - cont home regimen w/ lexapro and ativan prn  Squamous Cell - present to forehead and left cheek / OP follow up    Plan of care was discussed with patient,  at bedside with patient permission     Anticipated discharge date: 1-2 days  Anticipated discharge location: likely home    Additional Notes:      Case discussed with:  [x]Patient  []Family  [x]Nursing  []Case Management  DVT Prophylaxis:  []Lovenox  []Hep SQ  [x]SCDs  []Coumadin / Eliquis or Xarelto   []On Heparin gtt    Objective:     VS: BP (!) 143/73   Pulse 73   Temp 98.2 °F (36.8 °C) (Oral)   Resp 18   Ht 1.6 m (5' 3\")   Wt 53 kg (116 lb 13.5 oz)   SpO2 96%   BMI 20.70 kg/m²    Tmax/24hrs: Temp (24hrs), Av.9 °F (36.6 °C), Min:97.3 °F (36.3 °C), Max:98.6 °F (37 °C)    Intake/Output Summary (Last 24 hours) at 2025 0749  Last data filed at 2025 0653  Gross per 24 hour   Intake 2050 ml   Output 50 ml   Net 2000 ml       General:  Alert, NAD  HEENT: Oral mucosa moist; PERRLA  Cardiovascular:  RRR, Nl S1/S2  Pulmonary:  LSC throughout. Normal resp effort  GI:  +BS in all four quadrants, soft,

## 2025-07-31 NOTE — BRIEF OP NOTE
Brief Postoperative Note      Patient: Vicki Garza  YOB: 1960  MRN: [id number]    Date of Procedure: 7/30/2025     Pre-Op Diagnosis Codes:      * Closed nondisplaced fracture of left tibial tuberosity, initial encounter [S82.155A]     Post-Op Diagnosis: Same       Procedure(s):  INTRAMEDULLARY RODDING LEFT TIBIA SHAFT FRACTURE; [DEPUY SYNTHES ORTHO]; C-ARM; FLAT JENA; SYNTHES SUPRAPATELLAR NAIL     Surgeon(s):  Arturo Lilly MD     Assistant:   Surgical Assistant: Michael Stinson     Anesthesia: General  .IV FLUIDS: 1000 ml  TOURNIQUET TIME:  none used      Estimated Blood Loss (mL):  50 ml     Complications: None     Specimens:   * No specimens in log *     Implants:  Implant Name Type Inv. Item Serial No.  Lot No. LRB No. Used Action   NAIL TIBIAL ADV [id number] ST - [id number]   NAIL TIBIAL ADV [id number] ST   [employer]WD [id number] Left 1 Implanted   [place] RM 3X1150 MM W/ 3.8 MM BALL TIP STRL - [id number]   [place] RM 3X1150 MM W/ 3.8 MM BALL TIP STRL   [employer]WD [id number] Left 1 Implanted   SLEEVE IM NAIL TOM 8-11 MM LNG RIGID TIB FIX STRL - [id number]   SLEEVE IM NAIL TOM 8-11 MM LNG RIGID TIB FIX STRL   [employer]WD [id number] Left 1 Implanted   SCREW LK F/IM NAIL 5X32MM XL25 ST - [id number]   SCREW LK F/IM NAIL 5X32MM XL25 ST   [employer]WD [id number] Left 1 Implanted   SCREW LK F/IM NAIL 5X36MM XL25 ST - [id number]   SCREW LK F/IM NAIL 5X36MM XL25 ST   [employer]WD [id number] Left 1 Implanted   ENDCAP ORTH 0 MM EXTN FOR TIB NAIL ADV TI STRL - [id number]   ENDCAP ORTH 0 MM EXTN FOR TIB NAIL ADV TI STRL   [employer] [id number] Left 1 Implanted   SCREW LCK F/IM NAIL 5X48MM XL25 STR - [id number]   SCREW LCK F/IM NAIL 5X48MM XL25 STR   [employer]WD [id number] Left 1 Implanted          Drains: * No LDAs found *     Findings:  Present At Time Of Surgery (PATOS) (choose all levels that have infection present):  No infection present  Other

## 2025-07-31 NOTE — PROGRESS NOTES
Patient returned to the room from surgery at  0000 in stable condition asked to have shantanu put back on. Dressing is clean dry dry and intact and pulses palpable

## 2025-07-31 NOTE — ANESTHESIA PRE PROCEDURE
Department of Anesthesiology  Preprocedure Note       Name:  Vicki Garza   Age:  65 y.o.  :  1960                                          MRN:  [id number]         Date:  2025      Surgeon: Surgeon(s):  Arturo Lilly MD    Procedure: Procedure(s):  INTRAMEDULLARY RODDING LEFT TIBIA; [DEPUY SYNTHES ORTHO]; C-ARM; FLAT JENA; SYNTHES SUPRAPATELLAR NAIL    Medications prior to admission:   Prior to Admission medications    Medication Sig Start Date End Date Taking? Authorizing Provider   metoprolol succinate (TOPROL XL) 100 MG extended release tablet Take 1 tablet by mouth daily 7/3/25  Yes Provider, Laurie, MD   Multiple Vitamin (DAILY VITES) TABS Take 1 tablet by mouth daily   Yes Provider, Historical, MD   amLODIPine (NORVASC) 10 MG tablet Take 1 tablet by mouth daily 25  Yes Provider, MD Laurei   escitalopram (LEXAPRO) 10 MG tablet Take 1 tablet by mouth daily 20  Yes Automatic Reconciliation, Ar   ibuprofen (ADVIL;MOTRIN) 800 MG tablet TAKE 1 TABLET BY MOUTH THREE TIMES DAILY AS NEEDED FOR PAIN 9/3/19  Yes Automatic Reconciliation, Ar   LORazepam (ATIVAN) 0.5 MG tablet Take 1 tablet by mouth 2 times daily. 20  Yes Automatic Reconciliation, Ar       Current medications:    Current Facility-Administered Medications   Medication Dose Route Frequency Provider Last Rate Last Admin   • ceFAZolin (ANCEF) 2,000 mg in sterile water 20 mL IV syringe  2,000 mg IntraVENous Q8H Arturo Lilly MD   2,000 mg at 25 1625   • sodium chloride flush 0.9 % injection 5-40 mL  5-40 mL IntraVENous 2 times per day Adriana Ring ACNP   10 mL at 25 1225   • sodium chloride flush 0.9 % injection 5-40 mL  5-40 mL IntraVENous PRN Adriana Ring ACNP       • 0.9 % sodium chloride infusion   IntraVENous PRN Adriana Ring ACNP       • potassium chloride (KLOR-CON M) extended release tablet 40 mEq  40 mEq Oral PRN Adriana Ring ACNP        Or   • potassium

## 2025-07-31 NOTE — PLAN OF CARE
Problem: Physical Therapy - Adult  Goal: By Discharge: Performs mobility at highest level of function for planned discharge setting.  See evaluation for individualized goals.  Description: Physical Therapy Goals:  Initiated 7/31/2025 to be met within 7-10 days.    1.  Patient will move from supine to sit and sit to supine  in bed with modified independence.    2.  Patient will transfer from bed to chair and chair to bed with modified independence using the least restrictive device.  3.  Patient will perform sit to stand with modified independence.  4.  Patient will ambulate with supervision/set-up for 20 feet with the least restrictive device.   5.  Patient will ascend/descend 2 stairs with handrail(s) with minimal assistance.    PLOF: Independent ambulating with wheeled walker. Lives with spouse in single story home.       Outcome: Progressing     PHYSICAL THERAPY EVALUATION    Patient: Vicki Garza (65 y.o. female)  Date: 7/31/2025  Primary Diagnosis: S/p tibial fracture [Z87.81]  Left leg pain [M79.605]  Procedure(s) (LRB):  INTRAMEDULLARY RODDING LEFT TIBIA SHAFT FRACTURE; [DEPUY SYNTHES ORTHO]; C-ARM; FLAT JENA; SYNTHES SUPRAPATELLAR NAIL (Left) 1 Day Post-Op   Precautions: Fall Risk, Seizure, Weight Bearing,  , Left Lower Extremity Weight Bearing: Non Weight Bearing (in hinged knee brace 0-30 deg),  ,  ,  ,  ,      ASSESSMENT :  Patient resting in bed upon entry and agreeable to PT evaluation. Left hinged knee brace donned. Educated on role of PT, NWB precautions, and gentle left knee PROM only. Min A to manage right LE to sit EOB. Good sitting balance. CGA sit to stand transfer with RW. Pivots to BSC with good technique. Able to complete 3 hop steps with CGA and minimal step clearance for return to bed. Patient declines practice of stair negotiation today. Verbally educated on safe techniques to negotiate steps, including scooting if unable to clear a step by hopping on right leg. Left leg elevated on

## 2025-07-31 NOTE — PLAN OF CARE
Problem: Discharge Planning  Goal: Discharge to home or other facility with appropriate resources  7/31/2025 1636 by Flakita Oshea RN  Outcome: Progressing  Flowsheets (Taken 7/31/2025 1636)  Discharge to home or other facility with appropriate resources:   Identify barriers to discharge with patient and caregiver   Arrange for needed discharge resources and transportation as appropriate   Identify discharge learning needs (meds, wound care, etc)  Note: Discharge planning will begin or continue to meet patient goals. Patient will be discharged from [facility] to home or residence of choice in stable condition.   7/31/2025 1636 by Flakita Oshea RN  Outcome: Progressing  Flowsheets (Taken 7/31/2025 1636)  Discharge to home or other facility with appropriate resources:   Identify barriers to discharge with patient and caregiver   Arrange for needed discharge resources and transportation as appropriate   Identify discharge learning needs (meds, wound care, etc)     Problem: Safety - Adult  Goal: Free from fall injury  7/31/2025 1636 by Flakita Oshea RN  Outcome: Progressing  Flowsheets (Taken 7/31/2025 1636)  Free From Fall Injury: Instruct family/caregiver on patient safety  Note:  socks on, bed in low position, call bell within reach, bed rails x2  7/31/2025 1636 by Flakita Oshea RN  Outcome: Progressing  Flowsheets (Taken 7/31/2025 1636)  Free From Fall Injury: Instruct family/caregiver on patient safety     Problem: ABCDS Injury Assessment  Goal: Absence of physical injury  7/31/2025 1636 by Flakita Oshea RN  Outcome: Progressing  Flowsheets (Taken 7/31/2025 1636)  Absence of Physical Injury: Implement safety measures based on patient assessment  Note:  socks on, bed in low position, call bell within reach, bed rails x2  7/31/2025 1636 by Flakita Oshea RN  Outcome: Progressing  Flowsheets (Taken 7/31/2025 1636)  Absence of Physical Injury: Implement safety measures based on patient assessment

## 2025-07-31 NOTE — PLAN OF CARE
Problem: Occupational Therapy - Adult  Goal: By Discharge: Performs self-care activities at highest level of function for planned discharge setting.  See evaluation for individualized goals.  Description: Occupational Therapy Goals:  Initiated 7/31/2025 to be met within 7-10 days.    1.  Patient will perform bed mobility for ADLs with supervision.   2.  Patient will perform bathing with supervision/set-up.  3.  Patient will perform lower body dressing with supervision/set-up.  4.  Patient will perform toilet transfers with supervision/set-up.  5.  Patient will perform all aspects of toileting with supervision/set-up.  6.  Patient will participate in upper extremity therapeutic exercise/activities with supervision/set-up for 8 minutes to improve endurance and UB strength needed for ADLs    7.  Patient will utilize energy conservation techniques during functional activities with verbal cues.    PLOF: Pt lives with spouse, independent with ADLs and functional mobility w/o AD, recently started using walker due to pain in LLE.  Outcome: Progressing   OCCUPATIONAL THERAPY EVALUATION    Patient: Vicki Garza (65 y.o. female)  Date: 7/31/2025  Primary Diagnosis: S/p tibial fracture [Z87.81]  Left leg pain [M79.605]  Procedure(s) (LRB):  INTRAMEDULLARY RODDING LEFT TIBIA SHAFT FRACTURE; [DEPUY SYNTHES ORTHO]; C-ARM; RAY BELLA; SYNTHES SUPRAPATELLAR NAIL (Left) 1 Day Post-Op   Precautions: Fall Risk, Seizure, Weight Bearing,  , Left Lower Extremity Weight Bearing: Non Weight Bearing (in hinged knee brace 0-30 deg)    ASSESSMENT :  Pt is motivated to participate in OT. Educated on NWB precautions for LLE left knee immobilizing brace present, locked in 0-30 degrees flexion. Pt required Min A to don sock on LLE, and to transition to sit EOB. CGA for BSC txfr with FWW for support. Pt declined to hop, was able to follow NWB precautions while pivoting on RLE. SBA for toileting hygiene in sitting after urinating. Pt declined

## 2025-07-31 NOTE — OP NOTE
Operative Note      Patient: Vicki Garza  YOB: 1960  MRN: [id number]    Date of Procedure: 7/30/2025    Pre-Op Diagnosis Codes:      * Closed nondisplaced fracture of left tibial tuberosity, initial encounter [S82.155A]    Post-Op Diagnosis: Same       Procedure(s):  INTRAMEDULLARY RODDING LEFT TIBIA SHAFT FRACTURE; [DEPUY SYNTHES ORTHO]; C-ARM; FLAT JENA; SYNTHES SUPRAPATELLAR NAIL    Surgeon(s):  Arturo Lilly MD    Assistant:   Surgical Assistant: Michael Stinson    Anesthesia: General  .IV FLUIDS: 1000 ml  TOURNIQUET TIME:  none used     Estimated Blood Loss (mL):  50 ml    Complications: None    Specimens:   * No specimens in log *    Implants:  Implant Name Type Inv. Item Serial No.  Lot No. LRB No. Used Action   NAIL TIBIAL ADV [id number] ST - [id number]  NAIL TIBIAL ADV [id number] ST  [employer] [id number] Left 1 Implanted   [place] RM 3X1150 MM W/ 3.8 MM BALL TIP STRL - [id number]  [place] RM 3X1150 MM W/ 3.8 MM BALL TIP STRL  [employer] [id number] Left 1 Implanted   SLEEVE IM NAIL NAIL TOM 8-11 MM LNG RIGID TIB FIX STRL - [id number]  SLEEVE IM NAIL NAIL TOM 8-11 MM LNG RIGID TIB FIX STRL  [employer] [id number] Left 1 Implanted   SCREW LK F/IM NAIL 5X32MM XL25 ST - [id number]  SCREW LK F/IM NAIL 5X32MM XL25 ST  [employer] [id number] Left 1 Implanted   SCREW LK F/IM NAIL 5X36MM XL25 ST - [id number]  SCREW LK F/IM NAIL 5X36MM XL25 ST  [employer] [id number] Left 1 Implanted   ENDCAP ORTH 0 MM EXTN FOR TIB NAIL ADV TI STRL - [id number]  ENDCAP ORTH 0 MM EXTN FOR TIB NAIL ADV TI STRL  [employer] [id number] Left 1 Implanted   SCREW LCK F/IM NAIL 5X48MM XL25 STR - [id number]  SCREW LCK F/IM NAIL 5X48MM XL25 STR  [employer] [id number] Left 1 Implanted         Drains: * No LDAs found *    Findings:  Present At Time Of Surgery (PATOS) (choose all levels that have infection present):  No infection present  Other Findings:

## 2025-08-01 PROBLEM — E87.6 HYPOKALEMIA: Status: ACTIVE | Noted: 2025-08-01

## 2025-08-01 PROBLEM — F10.90 ALCOHOL USE DISORDER: Status: ACTIVE | Noted: 2025-08-01

## 2025-08-01 PROCEDURE — 6370000000 HC RX 637 (ALT 250 FOR IP): Performed by: ORTHOPAEDIC SURGERY

## 2025-08-01 PROCEDURE — 6370000000 HC RX 637 (ALT 250 FOR IP): Performed by: NURSE PRACTITIONER

## 2025-08-01 PROCEDURE — 99231 SBSQ HOSP IP/OBS SF/LOW 25: CPT | Performed by: STUDENT IN AN ORGANIZED HEALTH CARE EDUCATION/TRAINING PROGRAM

## 2025-08-01 PROCEDURE — 2500000003 HC RX 250 WO HCPCS: Performed by: NURSE PRACTITIONER

## 2025-08-01 PROCEDURE — 97535 SELF CARE MNGMENT TRAINING: CPT

## 2025-08-01 PROCEDURE — 94761 N-INVAS EAR/PLS OXIMETRY MLT: CPT

## 2025-08-01 PROCEDURE — 97116 GAIT TRAINING THERAPY: CPT

## 2025-08-01 PROCEDURE — 97168 OT RE-EVAL EST PLAN CARE: CPT

## 2025-08-01 PROCEDURE — 6360000002 HC RX W HCPCS: Performed by: ORTHOPAEDIC SURGERY

## 2025-08-01 PROCEDURE — 1100000000 HC RM PRIVATE

## 2025-08-01 PROCEDURE — 97164 PT RE-EVAL EST PLAN CARE: CPT

## 2025-08-01 RX ADMIN — ACETAMINOPHEN 650 MG: 325 TABLET ORAL at 14:17

## 2025-08-01 RX ADMIN — HYDROCODONE BITARTRATE AND ACETAMINOPHEN 2 TABLET: 5; 325 TABLET ORAL at 22:47

## 2025-08-01 RX ADMIN — METOPROLOL SUCCINATE 100 MG: 50 TABLET, EXTENDED RELEASE ORAL at 08:55

## 2025-08-01 RX ADMIN — SODIUM CHLORIDE, PRESERVATIVE FREE 10 ML: 5 INJECTION INTRAVENOUS at 08:56

## 2025-08-01 RX ADMIN — SODIUM CHLORIDE, PRESERVATIVE FREE 10 ML: 5 INJECTION INTRAVENOUS at 08:55

## 2025-08-01 RX ADMIN — HYDROCODONE BITARTRATE AND ACETAMINOPHEN 2 TABLET: 5; 325 TABLET ORAL at 16:54

## 2025-08-01 RX ADMIN — LORAZEPAM 0.5 MG: 0.5 TABLET ORAL at 22:47

## 2025-08-01 RX ADMIN — SODIUM CHLORIDE, PRESERVATIVE FREE 10 ML: 5 INJECTION INTRAVENOUS at 00:05

## 2025-08-01 RX ADMIN — SODIUM CHLORIDE, PRESERVATIVE FREE 10 ML: 5 INJECTION INTRAVENOUS at 00:04

## 2025-08-01 RX ADMIN — FOLIC ACID 1 MG: 1 TABLET ORAL at 08:55

## 2025-08-01 RX ADMIN — AMLODIPINE BESYLATE 10 MG: 10 TABLET ORAL at 08:55

## 2025-08-01 RX ADMIN — HYDROCODONE BITARTRATE AND ACETAMINOPHEN 2 TABLET: 5; 325 TABLET ORAL at 02:50

## 2025-08-01 RX ADMIN — ENOXAPARIN SODIUM 40 MG: 100 INJECTION SUBCUTANEOUS at 20:13

## 2025-08-01 RX ADMIN — ESCITALOPRAM 10 MG: 10 TABLET, FILM COATED ORAL at 08:54

## 2025-08-01 RX ADMIN — HYDROCODONE BITARTRATE AND ACETAMINOPHEN 2 TABLET: 5; 325 TABLET ORAL at 08:54

## 2025-08-01 RX ADMIN — SODIUM CHLORIDE, PRESERVATIVE FREE 10 ML: 5 INJECTION INTRAVENOUS at 20:14

## 2025-08-01 RX ADMIN — THERA TABS 1 TABLET: TAB at 08:55

## 2025-08-01 RX ADMIN — Medication 100 MG: at 08:55

## 2025-08-01 ASSESSMENT — PAIN SCALES - WONG BAKER
WONGBAKER_NUMERICALRESPONSE: HURTS A LITTLE BIT

## 2025-08-01 ASSESSMENT — PAIN DESCRIPTION - LOCATION
LOCATION: LEG;KNEE
LOCATION: KNEE;LEG
LOCATION: LEG;KNEE
LOCATION: LEG;KNEE
LOCATION: HEAD;KNEE
LOCATION: KNEE;LEG
LOCATION: KNEE
LOCATION: KNEE

## 2025-08-01 ASSESSMENT — PAIN - FUNCTIONAL ASSESSMENT
PAIN_FUNCTIONAL_ASSESSMENT: ACTIVITIES ARE NOT PREVENTED

## 2025-08-01 ASSESSMENT — PAIN DESCRIPTION - ORIENTATION
ORIENTATION: LEFT
ORIENTATION: LEFT;ANTERIOR
ORIENTATION: LEFT

## 2025-08-01 ASSESSMENT — PAIN DESCRIPTION - FREQUENCY
FREQUENCY: INTERMITTENT

## 2025-08-01 ASSESSMENT — PAIN DESCRIPTION - DESCRIPTORS
DESCRIPTORS: ACHING

## 2025-08-01 ASSESSMENT — PAIN DESCRIPTION - ONSET
ONSET: GRADUAL

## 2025-08-01 ASSESSMENT — PAIN SCALES - GENERAL
PAINLEVEL_OUTOF10: 3
PAINLEVEL_OUTOF10: 2
PAINLEVEL_OUTOF10: 0
PAINLEVEL_OUTOF10: 1
PAINLEVEL_OUTOF10: 3
PAINLEVEL_OUTOF10: 2
PAINLEVEL_OUTOF10: 0
PAINLEVEL_OUTOF10: 4
PAINLEVEL_OUTOF10: 2
PAINLEVEL_OUTOF10: 5
PAINLEVEL_OUTOF10: 6
PAINLEVEL_OUTOF10: 2
PAINLEVEL_OUTOF10: 0
PAINLEVEL_OUTOF10: 2

## 2025-08-01 ASSESSMENT — PAIN DESCRIPTION - DIRECTION
RADIATING_TOWARDS: NO

## 2025-08-01 ASSESSMENT — PAIN DESCRIPTION - PAIN TYPE
TYPE: SURGICAL PAIN

## 2025-08-01 NOTE — CARE COORDINATION
CM sent out Home Health referrals in [facility].       KAYLI emailed via SHARA Nieves RN Surgical Coordinator for Home Health orders when possible.           Laquita Barroso RN Case Manager  [phone]

## 2025-08-01 NOTE — CARE COORDINATION
CM uploaded Home Health order to [facility] for [facility] Home Health.       [facility] accepted patient in [facility] with Anticipated Start of Care of 08/06/2025.       CM booked [facility] Home Health in [facility].       Nilda with [employer] delivered DME Wheelchair and Bedside Commode to patient at the bedside.       Patient's family to transport patient home at time of discharge.               Laquita Barroso RN Case Manager  [phone]

## 2025-08-01 NOTE — CARE COORDINATION
DME ordered noted for wheelchair and bedside commode.    KAYLI ordered wheelchair and bedside commode on [facility] with [employer].    Dr. Lopez, signed the DME order in [facility].    KAYLI contacted [facility] with [facility], and updated that order is in [place] for wheelchair and bedside commode to deliver to patient.      RAINER Shaikh     [phone]

## 2025-08-01 NOTE — CARE COORDINATION
08/01/25 1152   Service Assessment   Patient Orientation Alert and Oriented;Person;Place;Situation;Self   Cognition Alert   History Provided By Patient   Primary Caregiver Self   Accompanied By/Relationship No one was at bedside with patient   Support Systems Spouse/Significant Other;Family Members   Patient's Healthcare Decision Maker is: Legal Next of Kin   PCP Verified by CM Yes   Last Visit to PCP Within last year   Prior Functional Level Independent in ADLs/IADLs   Current Functional Level Independent in ADLs/IADLs   Can patient return to prior living arrangement Yes   Family able to assist with home care needs: Yes   Would you like for me to discuss the discharge plan with any other family members/significant others, and if so, who? Yes  (patient's  (listed in patient's chart))   Financial Resources Medicare   Community Resources None   CM/SW Referral Other (see comment)  (discharge planning)   Social/Functional History   Lives With Spouse;Family   Type of Home House   Home Layout One level   Home Access Stairs to enter with rails   Entrance Stairs - Number of Steps 2   Entrance Stairs - Rails Both   Bathroom Shower/Tub Tub/Shower unit   Bathroom Toilet Standard   Bathroom Equipment None   Bathroom Accessibility Accessible   Home Equipment None   Receives Help From Family   Prior Level of Assist for ADLs Independent   Prior Level of Assist for Homemaking Independent   Homemaking Responsibilities Yes   Ambulation Assistance Independent   Prior Level of Assist for Transfers Independent   Active  Yes   Mode of Transportation Truck   Education n/a   Occupation Unemployed   Type of Occupation n/a   Discharge Planning   Type of Residence House   Living Arrangements Spouse/Significant Other;Children   Current Services Prior To Admission None   Potential Assistance Needed N/A   DME Ordered? No   Potential Assistance Purchasing Medications No   Type of Home Care Services None   Patient expects to be  discharged to: House   Follow Up Appointment: Best Day/Time  Monday PM   One/Two Story Residence One story   History of falls? 0   Services At/After Discharge   Transition of Care Consult (CM Consult) Home Health   Internal Home Health Yes   Services At/After Discharge Home Health    Resource Information Provided? No   Mode of Transport at Discharge Other (see comment)  (patient's  to transport at discharge)   Hospital Transport Time of Discharge   (TBD on day of discharge)   Confirm Follow Up Transport Self   Condition of Participation: Discharge Planning   The Plan for Transition of Care is related to the following treatment goals: patient is not medically ready   The Patient and/or Patient Representative was provided with a Choice of Provider? Patient   The Patient and/Or Patient Representative agree with the Discharge Plan? Yes   Freedom of Choice list was provided with basic dialogue that supports the patient's individualized plan of care/goals, treatment preferences, and shares the quality data associated with the providers?  Yes       CM spoke with patient at bedside.  HIPAA verified, face sheet and demographics verified.  Patient's  to transport at time of discharge.  Will send out Home Health referrals.   Noted DME orders.      RAINER Shaikh     [phone]

## 2025-08-01 NOTE — PLAN OF CARE
Problem: Discharge Planning  Goal: Discharge to home or other facility with appropriate resources  7/31/2025 2223 by Arslan Murphy RN  Outcome: Progressing  Flowsheets  Taken 7/31/2025 2223  Discharge to home or other facility with appropriate resources:   Identify barriers to discharge with patient and caregiver   Identify discharge learning needs (meds, wound care, etc)  Taken 7/31/2025 2123  Discharge to home or other facility with appropriate resources: Identify barriers to discharge with patient and caregiver  Note: Patient will be discharged when patient is medically stable, barriers to discharge identified.     Problem: Safety - Adult  Goal: Free from fall injury  7/31/2025 2223 by Arslan Murphy RN  Outcome: Progressing  Flowsheets (Taken 7/31/2025 2223)  Free From Fall Injury:   Instruct family/caregiver on patient safety   Based on caregiver fall risk screen, instruct family/caregiver to ask for assistance with transferring infant if caregiver noted to have fall risk factors  Note: Call bell and bed side table will be kept within reach     Problem: ABCDS Injury Assessment  Goal: Absence of physical injury  7/31/2025 1636 by Flakita Oshea RN  Outcome: Progressing  Flowsheets (Taken 7/31/2025 1636)  Absence of Physical Injury: Implement safety measures based on patient assessment  Note:  socks on, bed in low position, call bell within reach, bed rails x2     Problem: Pain  Goal: Verbalizes/displays adequate comfort level or baseline comfort level  7/31/2025 2223 by Arslan Murphy RN  Outcome: Progressing  Flowsheets (Taken 7/31/2025 2223)  Verbalizes/displays adequate comfort level or baseline comfort level:   Encourage patient to monitor pain and request assistance   Assess pain using appropriate pain scale   Administer analgesics based on type and severity of pain and evaluate response   Implement non-pharmacological measures as appropriate and evaluate response   Consider cultural

## 2025-08-01 NOTE — PROGRESS NOTES
Patient: Vicki Garza               Sex: female          DOA: 7/30/2025  YOB: 1960      Age:  65 y.o.        LOS:  LOS: 2 days       S/P  Procedure(s):  INTRAMEDULLARY RODDING LEFT TIBIA SHAFT FRACTURE; [DEPUY SYNTHES ORTHO]; C-ARM; FLAT JENA; SYNTHES SUPRAPATELLAR NAIL               SUBJECTIVE     Reports doing quite well and her pain is controlled with Norco.    Denies cp, sob, abd pain     OBJECTIVE      Blood pressure 131/78, pulse 72, temperature 97.5 °F (36.4 °C), temperature source Oral, resp. rate 16, height 1.6 m (5' 3\"), weight 53 kg (116 lb 13.5 oz), SpO2 95%.     Well developed, Well Nourished alert, cooperative, and no distress, with a male   Orthopedic location: Left lower extremity  Incision , dressing in place incision on inspected at this time  Skin condition: Visible portions of her skin, thigh and toes are intact  Sensory yes is intact   Motor yes is intact: As relates ankle dorsiflexion, plantarflexion, inversion eversion.  NV intact  2+distal pulses  Neg calf tenderness    LABS     Lab Results   Component Value Date    WBC 20.1 (H) 07/31/2025    HGB 12.3 07/31/2025    HCT 36.8 07/31/2025    .2 (H) 07/31/2025     07/31/2025     Lab Results   Component Value Date     (L) 07/31/2025    K 3.4 (L) 07/31/2025     07/31/2025    CO2 19 (L) 07/31/2025    BUN 12 07/31/2025    CREATININE 0.38 (L) 07/31/2025    GLUCOSE 118 (H) 07/31/2025    CALCIUM 8.9 07/31/2025    BILITOT 0.4 07/30/2025    ALKPHOS 97 07/30/2025    AST 14 07/30/2025    ALT 10 07/30/2025    LABGLOM >90 07/31/2025    GFRAA >60 05/10/2020    AGRATIO 1.2 05/10/2020    GLOB 3.3 07/30/2025        Lab Results   Component Value Date    INR 0.9 07/30/2025    INR 0.9 07/30/2025    PROTIME 12.8 07/30/2025    PROTIME 12.8 07/30/2025        ASSESSMENT AND PLAN     Principal Problem:    S/p tibial fracture  Active Problems:    Left leg pain    Closed fracture of left proximal

## 2025-08-01 NOTE — PROGRESS NOTES
[facility] Hospitalist Group  Progress Note    Patient: Vicki Garza Age: 65 y.o. : 1960 MR#: [id number] SSN: [id number]  Date: 2025                 DVT Prophylaxis:  [x]Lovenox  []Hep SQ  []SCDs  []Coumadin   []On Heparin gtt []PO anticoagulant    Anticipated discharge: 2025 to home with home health, pending arrangement of home health    Subjective:     Patient was seen and examined at bedside she was resting in bed comfortably.  She denied any complaints.      Objective:   VS: /78   Pulse 72   Temp 97.5 °F (36.4 °C) (Oral)   Resp 16   Ht 1.6 m (5' 3\")   Wt 53 kg (116 lb 13.5 oz)   SpO2 95%   BMI 20.70 kg/m²    Tmax/24hrs: Temp (24hrs), Av °F (36.7 °C), Min:97.5 °F (36.4 °C), Max:98.4 °F (36.9 °C)    Intake/Output Summary (Last 24 hours) at 2025 1523  Last data filed at 2025 0330  Gross per 24 hour   Intake --   Output 700 ml   Net -700 ml        PHYSICAL EXAM  General Appearance: No acute distress  HENT: normocephalic/atraumatic, moist mucus membranes  Neck: No JVD, supple  Lungs: CTA with normal respiratory effort  CV: RRR,   Abdomen: soft,   : no suprapubic tenderness   Extremities: Decreased range of motion of left ankle; left leg covered in a cast  Neuro: Alert and oriented  Skin: Warm and dry  Psych: appropriate mood and affect    Current Facility-Administered Medications   Medication Dose Route Frequency    enoxaparin (LOVENOX) injection 40 mg  40 mg SubCUTAneous Daily    sodium chloride flush 0.9 % injection 5-40 mL  5-40 mL IntraVENous 2 times per day    sodium chloride flush 0.9 % injection 5-40 mL  5-40 mL IntraVENous PRN    0.9 % sodium chloride infusion   IntraVENous PRN    potassium chloride (KLOR-CON M) extended release tablet 40 mEq  40 mEq Oral PRN    Or    potassium bicarb-citric acid (EFFER-K) effervescent tablet 40 mEq  40 mEq Oral PRN    Or    potassium chloride 10 mEq/100 mL IVPB (Peripheral Line)  10 mEq IntraVENous  results found for this or any previous visit (from the past 24 hours).    Imaging:  XR TIBIA FIBULA LEFT (2 VIEWS)  Result Date: 7/31/2025  EXAMINATION: Left tibia fibula 2 views INDICATION: Hardware fixation COMPARISON: 7/30/2025, 0435 hours FINDINGS: Limited intraoperative multiple frontal and lateral views of the tibia/fibular demonstrate interval intramedullary nail fixation of the tibia in real-time, with well aligned proximal tibial fracture fragments, and distal fibula fracture deformity again demonstrated.     Intraoperative findings as above. Electronically signed by [place]acano Berger    XR CHEST (2 VW)  Result Date: 7/31/2025  EXAMINATION: Chest 2 views INDICATION: Preoperative COMPARISON: 7/30/2025 FINDINGS: Frontal and lateral views. Mediastinal silhouette and pulmonary vasculature unremarkable. Left basilar coarse streaky density. No confluent consolidation. No evidence of pneumothorax. No obvious acute osseous findings.     No clearly acute findings. Left basilar likely streaky atelectasis/scar. Electronically signed by [place] YANCEY XR TECHNOLOGIST SERVICE  Result Date: 7/30/2025  Radiology exam is complete. No Radiologist dictation. Please follow up with ordering provider.       Assessment/Plan:     Fracture of left tibia  S/p intramedullary vu and nailing on July 30, 2025  Orthopedic surgery input appreciated  PT OT input appreciated  Continue supportive care with analgesics as needed    2.  Hypokalemia  Supplement potassium and recheck levels    3.  Essential hypertension  Continue amlodipine and metoprolol succinate    4.  Alcohol use disorder  Patient has been counseled about alcohol abuse and cessation  Continue thiamine and folic acid  Continue lorazepam as needed for signs/symptoms of alcohol withdrawal per [facility] protocol    Please contact Dr. Lopez if there are any questions. Greater than 25 minutes were spent reviewing the patient's chart, obtaining a thorough history,  performing a physical exam, placing orders and dictating this document.  Findings and plan were also discussed with the patient/patient's family and with RN.  Greater than 50% of the time was spent on direct patient care.         Signed By: [unfilled]     August 1, 2025 3:23 PM

## 2025-08-01 NOTE — PLAN OF CARE
Problem: Discharge Planning  Goal: Discharge to home or other facility with appropriate resources  Outcome: Progressing  Flowsheets (Taken 8/1/2025 1353)  Discharge to home or other facility with appropriate resources:   Identify barriers to discharge with patient and caregiver   Arrange for needed discharge resources and transportation as appropriate  Note: Discharge planning will begin or continue to meet patient goals. Patient will be discharged from [facility] to home or residence of choice in stable condition.      Problem: Safety - Adult  Goal: Free from fall injury  Outcome: Progressing  Flowsheets (Taken 8/1/2025 1353)  Free From Fall Injury: Instruct family/caregiver on patient safety  Note:  socks on, bed in low position, call bell within reach, bed rails x2     Problem: ABCDS Injury Assessment  Goal: Absence of physical injury  Outcome: Progressing  Flowsheets (Taken 8/1/2025 1353)  Absence of Physical Injury: Implement safety measures based on patient assessment  Note:  socks on, bed in low position, call bell within reach, bed rails x2     Problem: Pain  Goal: Verbalizes/displays adequate comfort level or baseline comfort level  Outcome: Progressing  Flowsheets (Taken 7/31/2025 2223 by Arslan Murphy, RN)  Verbalizes/displays adequate comfort level or baseline comfort level:   Encourage patient to monitor pain and request assistance   Assess pain using appropriate pain scale   Administer analgesics based on type and severity of pain and evaluate response   Implement non-pharmacological measures as appropriate and evaluate response   Consider cultural and social influences on pain and pain management  Note: Pain will be managed and controlled with a pain level of 4 /10 or less. Patient educated on notifying nurse if pain increases above 3.      Problem: Skin/Tissue Integrity - Adult  Goal: Skin integrity remains intact  Description: 1.  Monitor for areas of redness and/or skin breakdown  2.

## 2025-08-01 NOTE — PLAN OF CARE
Problem: Occupational Therapy - Adult  Goal: By Discharge: Performs self-care activities at highest level of function for planned discharge setting.  See evaluation for individualized goals.  Description: Occupational Therapy Goals:  Initiated 7/31/2025, re-evaluated on 8/1/2025 due to new OT orders with updated WB, goals remain appropriate to be met within 7-10 days.    1.  Patient will perform bed mobility for ADLs with supervision.   2.  Patient will perform bathing with supervision/set-up.  3.  Patient will perform lower body dressing with supervision/set-up.  4.  Patient will perform toilet transfers with supervision/set-up.  5.  Patient will perform all aspects of toileting with supervision/set-up.  6.  Patient will participate in upper extremity therapeutic exercise/activities with supervision/set-up for 8 minutes to improve endurance and UB strength needed for ADLs    7.  Patient will utilize energy conservation techniques during functional activities with verbal cues.    PLOF: Pt lives with spouse, independent with ADLs and functional mobility w/o AD, recently started using walker due to pain in LLE.  Outcome: Progressing  OCCUPATIONAL THERAPY RE-EVALUATION    Patient: Vicki Garza (65 y.o. female)  Date: 8/1/2025  Primary Diagnosis: S/p tibial fracture [Z87.81]  Left leg pain [M79.605]  Procedure(s) (LRB):  INTRAMEDULLARY RODDING LEFT TIBIA SHAFT FRACTURE; [DEPUY SYNTHES ORTHO]; C-ARM; RAY BELLA; SYNTHES SUPRAPATELLAR NAIL (Left) 2 Days Post-Op   Precautions: Fall Risk, Seizure, Weight Bearing,  , Left Lower Extremity Weight Bearing: Partial Weight Bearing    ASSESSMENT :  Pt re-evaluated due to new updated WB orders received. Pt educated on PWB on LLE and how it relates to ADLs and functional mobility, pt verbalized understanding, very fearful of WB on LLE due to fear of increased pain. SBA to don sock on RLE with increased time. CGA for bed mobility and functional txfr to BSC positioned 1/2 way to

## 2025-08-01 NOTE — PLAN OF CARE
Problem: Physical Therapy - Adult  Goal: By Discharge: Performs mobility at highest level of function for planned discharge setting.  See evaluation for individualized goals.  Description: Physical Therapy Goals:  Initiated 7/31/2025 to be met within 7-10 days.  Re-evaluated 8/1 s/p change in WB status.     1.  Patient will move from supine to sit and sit to supine  in bed with modified independence.    2.  Patient will transfer from bed to chair and chair to bed with modified independence using the least restrictive device.  3.  Patient will perform sit to stand with modified independence.  4.  Patient will ambulate with supervision/set-up for 20 feet with the least restrictive device.   5.  Patient will ascend/descend 2 stairs with handrail(s) with minimal assistance.    PLOF: Independent ambulating with wheeled walker. Lives with spouse in single story home.       8/1/2025 1145 by Freda Forrester, PT  Outcome: Progressing  8/1/2025 1145 by Freda Forrester, PT  Outcome: Progressing   PHYSICAL THERAPY RE-EVALUATION    Patient: Vicki Garza (65 y.o. female)  Date: 8/1/2025  Primary Diagnosis: S/p tibial fracture [Z87.81]  Left leg pain [M79.605]  Procedure(s) (LRB):  INTRAMEDULLARY RODDING LEFT TIBIA SHAFT FRACTURE; [DEPUY SYNTHES ORTHO]; C-ARM; FLAT JENA; SYNTHES SUPRAPATELLAR NAIL (Left) 2 Days Post-Op   Precautions: Fall Risk, Seizure, Weight Bearing,  , Left Lower Extremity Weight Bearing: Partial Weight Bearing,  ,  ,  ,  ,      ASSESSMENT :  Pt received in bed in NAD, seen alongside OT to maximize pt participation due to increase pain with mobilization. Pt updated on WB change and provided demo for gait training. Pt min A to EOB with increase time needed for cueing/sequencing. Sit to stand CGA into RW, poor placement of UE despite constant cueing. Ambulate to [facility] with appropriate step-to pattern and good compliance with WB demo 'd by expected heavy reliance on BUE support. Takes a seated rest break and then  Training:                       Gait  Gait Training: Yes  Left Side Weight Bearing: Partial (%)  Right Side Weight Bearing: Full  Overall Level of Assistance: Contact guard assistance;Minimal assistance  Distance (ft): 6 Feet (2x3')  Assistive Device: Walker, rolling  Interventions: Verbal cues;Safety awareness training  Base of Support: Narrowed;Shift to right  Speed/Ernestine: Shuffled  Step Length: Right shortened;Left shortened  Stance: Left decreased  Gait Abnormalities: Decreased step clearance;Antalgic                   Pain:  Intensity Pre-treatment: 0/10   Intensity Post-treatment: 0/10  Scale: Numeric Rating Scale    Activity Tolerance:   Activity Tolerance: Patient tolerated treatment well  Please refer to the flowsheet for vital signs taken during this treatment.    After treatment:   []         Patient left in no apparent distress sitting up in chair  [x]         Patient left in no apparent distress in bed  [x]         Call bell left within reach  [x]         Nursing notified  []         Caregiver present  [x]         Bed/chair alarm activated  []         SCDs applied    COMMUNICATION/EDUCATION:   Patient Education  Education Given To: Patient  Education Provided: Role of Therapy;Plan of Care;Transfer Training;Mobility Training;Precautions;Equipment  Education Method: Verbal;Teach Back;Demonstration  Barriers to Learning: None  Education Outcome: Verbalized understanding;Demonstrated understanding;Continued education needed    Thank you for this referral.  Freda Forrester, PT  Minutes: 17     Patient will perform sit<->stand transfer independently with rolling walker by 2 weeks to allow patient to get on/off toilet safely.

## 2025-08-02 VITALS
BODY MASS INDEX: 20.7 KG/M2 | WEIGHT: 116.84 LBS | HEIGHT: 63 IN | DIASTOLIC BLOOD PRESSURE: 81 MMHG | RESPIRATION RATE: 16 BRPM | OXYGEN SATURATION: 93 % | HEART RATE: 76 BPM | SYSTOLIC BLOOD PRESSURE: 143 MMHG | TEMPERATURE: 98.1 F

## 2025-08-02 LAB
ALBUMIN SERPL-MCNC: 3.1 G/DL (ref 3.4–5)
ALBUMIN/GLOB SERPL: 0.9 (ref 0.8–1.7)
ALP SERPL-CCNC: 117 U/L (ref 45–117)
ALT SERPL-CCNC: 11 U/L (ref 10–35)
ANION GAP SERPL CALC-SCNC: 12 MMOL/L (ref 3–18)
AST SERPL-CCNC: 14 U/L (ref 10–38)
BASOPHILS # BLD: 0.04 K/UL (ref 0–0.1)
BASOPHILS NFR BLD: 0.3 % (ref 0–2)
BILIRUB SERPL-MCNC: 0.5 MG/DL (ref 0.2–1)
BUN SERPL-MCNC: 9 MG/DL (ref 6–23)
BUN/CREAT SERPL: 22 (ref 12–20)
CALCIUM SERPL-MCNC: 10.1 MG/DL (ref 8.5–10.1)
CHLORIDE SERPL-SCNC: 104 MMOL/L (ref 98–107)
CO2 SERPL-SCNC: 23 MMOL/L (ref 21–32)
CREAT SERPL-MCNC: 0.4 MG/DL (ref 0.6–1.3)
DIFFERENTIAL METHOD BLD: ABNORMAL
EOSINOPHIL # BLD: 0.15 K/UL (ref 0–0.4)
EOSINOPHIL NFR BLD: 1.2 % (ref 0–5)
ERYTHROCYTE [DISTWIDTH] IN BLOOD BY AUTOMATED COUNT: 11.4 % (ref 11.6–14.5)
GLOBULIN SER CALC-MCNC: 3.6 G/DL (ref 2–4)
GLUCOSE SERPL-MCNC: 99 MG/DL (ref 74–108)
HCT VFR BLD AUTO: 37.7 % (ref 35–45)
HGB BLD-MCNC: 12.7 G/DL (ref 12–16)
IMM GRANULOCYTES # BLD AUTO: 0.06 K/UL (ref 0–0.04)
IMM GRANULOCYTES NFR BLD AUTO: 0.5 % (ref 0–0.5)
LYMPHOCYTES # BLD: 2.56 K/UL (ref 0.9–3.6)
LYMPHOCYTES NFR BLD: 19.9 % (ref 21–52)
MAGNESIUM SERPL-MCNC: 2 MG/DL (ref 1.6–2.6)
MCH RBC QN AUTO: 34.8 PG (ref 24–34)
MCHC RBC AUTO-ENTMCNC: 33.7 G/DL (ref 31–37)
MCV RBC AUTO: 103.3 FL (ref 78–100)
MONOCYTES # BLD: 1.33 K/UL (ref 0.05–1.2)
MONOCYTES NFR BLD: 10.3 % (ref 3–10)
NEUTS SEG # BLD: 8.73 K/UL (ref 1.8–8)
NEUTS SEG NFR BLD: 67.8 % (ref 40–73)
NRBC # BLD: 0 K/UL (ref 0–0.01)
NRBC BLD-RTO: 0 PER 100 WBC
PLATELET # BLD AUTO: 368 K/UL (ref 135–420)
PMV BLD AUTO: 9.7 FL (ref 9.2–11.8)
POTASSIUM SERPL-SCNC: 3.9 MMOL/L (ref 3.5–5.5)
PROT SERPL-MCNC: 6.7 G/DL (ref 6.4–8.2)
RBC # BLD AUTO: 3.65 M/UL (ref 4.2–5.3)
SODIUM SERPL-SCNC: 139 MMOL/L (ref 136–145)
WBC # BLD AUTO: 12.9 K/UL (ref 4.6–13.2)

## 2025-08-02 PROCEDURE — 6370000000 HC RX 637 (ALT 250 FOR IP): Performed by: NURSE PRACTITIONER

## 2025-08-02 PROCEDURE — 99239 HOSP IP/OBS DSCHRG MGMT >30: CPT | Performed by: STUDENT IN AN ORGANIZED HEALTH CARE EDUCATION/TRAINING PROGRAM

## 2025-08-02 PROCEDURE — 80053 COMPREHEN METABOLIC PANEL: CPT

## 2025-08-02 PROCEDURE — 2500000003 HC RX 250 WO HCPCS: Performed by: NURSE PRACTITIONER

## 2025-08-02 PROCEDURE — 85025 COMPLETE CBC W/AUTO DIFF WBC: CPT

## 2025-08-02 PROCEDURE — 36415 COLL VENOUS BLD VENIPUNCTURE: CPT

## 2025-08-02 PROCEDURE — 83735 ASSAY OF MAGNESIUM: CPT

## 2025-08-02 PROCEDURE — 6370000000 HC RX 637 (ALT 250 FOR IP): Performed by: ORTHOPAEDIC SURGERY

## 2025-08-02 RX ORDER — AMOXICILLIN 250 MG
1 CAPSULE ORAL 2 TIMES DAILY PRN
Qty: 14 TABLET | Refills: 0 | Status: SHIPPED | OUTPATIENT
Start: 2025-08-02 | End: 2025-08-09

## 2025-08-02 RX ORDER — THIAMINE MONONITRATE (VIT B1) 100 MG
100 TABLET ORAL DAILY
Qty: 21 TABLET | Refills: 0 | Status: SHIPPED | OUTPATIENT
Start: 2025-08-03 | End: 2025-08-24

## 2025-08-02 RX ORDER — FOLIC ACID 1 MG/1
1 TABLET ORAL DAILY
Qty: 30 TABLET | Refills: 0 | Status: SHIPPED | OUTPATIENT
Start: 2025-08-03

## 2025-08-02 RX ORDER — OXYCODONE AND ACETAMINOPHEN 10; 325 MG/1; MG/1
1 TABLET ORAL EVERY 6 HOURS PRN
Qty: 20 TABLET | Refills: 0 | Status: SHIPPED | OUTPATIENT
Start: 2025-08-02 | End: 2025-08-07

## 2025-08-02 RX ADMIN — SODIUM CHLORIDE, PRESERVATIVE FREE 10 ML: 5 INJECTION INTRAVENOUS at 08:22

## 2025-08-02 RX ADMIN — METOPROLOL SUCCINATE 100 MG: 50 TABLET, EXTENDED RELEASE ORAL at 08:21

## 2025-08-02 RX ADMIN — THERA TABS 1 TABLET: TAB at 08:21

## 2025-08-02 RX ADMIN — FOLIC ACID 1 MG: 1 TABLET ORAL at 08:21

## 2025-08-02 RX ADMIN — Medication 100 MG: at 08:21

## 2025-08-02 RX ADMIN — HYDROCODONE BITARTRATE AND ACETAMINOPHEN 2 TABLET: 5; 325 TABLET ORAL at 04:34

## 2025-08-02 RX ADMIN — AMLODIPINE BESYLATE 10 MG: 10 TABLET ORAL at 08:21

## 2025-08-02 RX ADMIN — ESCITALOPRAM 10 MG: 10 TABLET, FILM COATED ORAL at 08:21

## 2025-08-02 RX ADMIN — HYDROCODONE BITARTRATE AND ACETAMINOPHEN 2 TABLET: 5; 325 TABLET ORAL at 09:52

## 2025-08-02 ASSESSMENT — PAIN SCALES - GENERAL
PAINLEVEL_OUTOF10: 0
PAINLEVEL_OUTOF10: 5
PAINLEVEL_OUTOF10: 0
PAINLEVEL_OUTOF10: 2

## 2025-08-02 ASSESSMENT — PAIN DESCRIPTION - LOCATION
LOCATION: KNEE
LOCATION: LEG

## 2025-08-02 ASSESSMENT — PAIN DESCRIPTION - FREQUENCY
FREQUENCY: CONTINUOUS
FREQUENCY: INTERMITTENT

## 2025-08-02 ASSESSMENT — PAIN DESCRIPTION - ORIENTATION
ORIENTATION: LEFT
ORIENTATION: LEFT

## 2025-08-02 ASSESSMENT — PAIN DESCRIPTION - DESCRIPTORS
DESCRIPTORS: ACHING
DESCRIPTORS: ACHING

## 2025-08-02 ASSESSMENT — PAIN - FUNCTIONAL ASSESSMENT
PAIN_FUNCTIONAL_ASSESSMENT: ACTIVITIES ARE NOT PREVENTED
PAIN_FUNCTIONAL_ASSESSMENT: ACTIVITIES ARE NOT PREVENTED

## 2025-08-02 ASSESSMENT — PAIN DESCRIPTION - ONSET
ONSET: ON-GOING
ONSET: GRADUAL

## 2025-08-02 ASSESSMENT — PAIN DESCRIPTION - PAIN TYPE
TYPE: SURGICAL PAIN
TYPE: ACUTE PAIN

## 2025-08-02 NOTE — PLAN OF CARE
Problem: Discharge Planning  Goal: Discharge to home or other facility with appropriate resources  8/2/2025 0036 by Annabella Sibley RN  Outcome: Progressing  Flowsheets (Taken 8/2/2025 0036)  Discharge to home or other facility with appropriate resources:   Identify barriers to discharge with patient and caregiver   Arrange for needed discharge resources and transportation as appropriate   Identify discharge learning needs (meds, wound care, etc)  8/1/2025 1353 by Flakita Oshea RN  Outcome: Progressing  Flowsheets (Taken 8/1/2025 1353)  Discharge to home or other facility with appropriate resources:   Identify barriers to discharge with patient and caregiver   Arrange for needed discharge resources and transportation as appropriate  Note: Discharge planning will begin or continue to meet patient goals. Patient will be discharged from [facility] to home or residence of choice in stable condition.      Problem: Safety - Adult  Goal: Free from fall injury  8/2/2025 0036 by Annabella Sibley RN  Outcome: Progressing  Flowsheets (Taken 8/1/2025 1353 by Flakita Oshea RN)  Free From Fall Injury: Instruct family/caregiver on patient safety  8/1/2025 1353 by Flakita Oshea RN  Outcome: Progressing  Flowsheets (Taken 8/1/2025 1353)  Free From Fall Injury: Instruct family/caregiver on patient safety  Note:  socks on, bed in low position, call bell within reach, bed rails x2     Problem: ABCDS Injury Assessment  Goal: Absence of physical injury  8/2/2025 0036 by Annabella Sibley RN  Outcome: Progressing  Flowsheets (Taken 8/1/2025 1353 by Flakita Oshea RN)  Absence of Physical Injury: Implement safety measures based on patient assessment  8/1/2025 1353 by Flakita Oshea RN  Outcome: Progressing  Flowsheets (Taken 8/1/2025 1353)  Absence of Physical Injury: Implement safety measures based on patient assessment  Note:  socks on, bed in low position, call bell within reach, bed rails x2     Problem: Pain  Goal:

## 2025-08-02 NOTE — CARE COORDINATION
Discharge order noted for today. Orders received.  Home health services prearranged with [facility].  All ordered DME delivered at bedside yesterday.  No other needs identified at this time. Case management remains available as needed.  Patient states her daughter will transport her home at time of discharge.    Lesa SCOTTN,RN, [facility]  Case Management  [phone]

## 2025-08-02 NOTE — DISCHARGE SUMMARY
Hospitalist Discharge Summary      Patient: Vicki Garza MRN: [id number]  CSN: [id number]    YOB: 1960  Age: 65 y.o.  Sex: female    DOA: 7/30/2025 LOS:  LOS: 3 days   Discharge Date:     Admission Diagnoses: S/p tibial fracture [Z87.81]  Left leg pain [M79.605]    Discharge Diagnoses:    Fracture of left tibia  Hypokalemia  Essential hypertension  Alcohol use disorder    Discharge Condition: Fair    Discharge To: Home with home health    CODE STATUS: Full Code         PHYSICAL EXAM  Visit Vitals  BP (!) 143/81   Pulse 76   Temp 98.1 °F (36.7 °C) (Oral)   Resp 16   Ht 1.6 m (5' 3\")   Wt 53 kg (116 lb 13.5 oz)   SpO2 93%   BMI 20.70 kg/m²     General Appearance: No acute distress  HENT: normocephalic/atraumatic, moist mucus membranes  Neck: No JVD, supple  Lungs: CTA with normal respiratory effort  CV: RRR,   Abdomen: soft,   : no suprapubic tenderness   Extremities: Decreased range of motion of left ankle; left leg covered in a cast  Neuro: Alert and oriented  Skin: Warm and dry  Psych: appropriate mood and affect                                HPI:    Vicki is a 65 y.o.   female with PMH of hypertension and tobacco abuse who presents with left leg pain progressively worsening over the last 1.5 weeks prompting presentation to the ED.  Patient denies any recent falls states she has has had a worsening limp over the last 3 weeks or so and she believes fracture to her left leg was a result of this limp, denies any history or knowledge of osteoporosis, brittle bones or cancer history, no falls.  She shares she has been using a walker to assist with ambulation long term.  Endorses drinking 3-4 beers daily over the last 1 to 2 years and smoking about half pack of cigarettes daily, denies drug abuse.  She indicates she does not have any pain currently in bed however has significant pain prior to ER in setting of weightbearing.  She is without other concerns currently including no shortness of

## 2025-08-02 NOTE — CARE COORDINATION
08/02/25 1030   IMM Letter   IMM Letter given to Patient/Family/Significant other/Guardian/POA/by: Lesa Araujo RN   IMM Letter date given: 08/02/25   IMM Letter time given: 1016     Lesa HUTTON,RN, [facility][note 3]  Case Management  [phone]

## 2025-08-02 NOTE — PROGRESS NOTES
Ortho    Pt seen and evaluated  Doing well  Pain well controlled  Progressing with PT  No cp, sob, abd pain    BP (!) 143/84   Pulse 80   Temp 98.2 °F (36.8 °C) (Oral)   Resp 16   Ht 1.6 m (5' 3\")   Wt 53 kg (116 lb 13.5 oz)   SpO2 92%   BMI 20.70 kg/m²   Left LE  Dressing cdi  Brace in place  Compartments soft  Nv intact  Neg calf tenderness    A: sp left tibial nail    P:  PT- pwb Left LE.  Lovenox.  Dc planning- home vs rehab.

## 2025-08-05 ENCOUNTER — TELEPHONE (OUTPATIENT)
Age: 65
End: 2025-08-05

## 2025-08-11 ENCOUNTER — OFFICE VISIT (OUTPATIENT)
Age: 65
End: 2025-08-11
Payer: MEDICARE

## 2025-08-11 DIAGNOSIS — S82.192D OTHER CLOSED FRACTURE OF PROXIMAL END OF LEFT TIBIA WITH ROUTINE HEALING, SUBSEQUENT ENCOUNTER: Primary | ICD-10-CM

## 2025-08-11 DIAGNOSIS — Z98.890 POST-OPERATIVE STATE: ICD-10-CM

## 2025-08-11 PROCEDURE — 99024 POSTOP FOLLOW-UP VISIT: CPT | Performed by: ORTHOPAEDIC SURGERY

## 2025-08-11 PROCEDURE — 73590 X-RAY EXAM OF LOWER LEG: CPT | Performed by: ORTHOPAEDIC SURGERY

## 2025-08-11 RX ORDER — OXYCODONE AND ACETAMINOPHEN 5; 325 MG/1; MG/1
1 TABLET ORAL EVERY 6 HOURS PRN
Qty: 28 TABLET | Refills: 0 | Status: SHIPPED | OUTPATIENT
Start: 2025-08-11 | End: 2025-08-18

## 2025-08-21 ENCOUNTER — TELEPHONE (OUTPATIENT)
Age: 65
End: 2025-08-21

## 2025-08-21 DIAGNOSIS — S82.192D OTHER CLOSED FRACTURE OF PROXIMAL END OF LEFT TIBIA WITH ROUTINE HEALING, SUBSEQUENT ENCOUNTER: Primary | ICD-10-CM

## 2025-08-21 RX ORDER — OXYCODONE AND ACETAMINOPHEN 5; 325 MG/1; MG/1
1 TABLET ORAL EVERY 6 HOURS PRN
Qty: 28 TABLET | Refills: 0 | Status: SHIPPED | OUTPATIENT
Start: 2025-08-21 | End: 2025-08-28

## 2025-08-30 DIAGNOSIS — S82.192D OTHER CLOSED FRACTURE OF PROXIMAL END OF LEFT TIBIA WITH ROUTINE HEALING, SUBSEQUENT ENCOUNTER: Primary | ICD-10-CM

## 2025-08-30 RX ORDER — HYDROCODONE BITARTRATE AND ACETAMINOPHEN 5; 325 MG/1; MG/1
1 TABLET ORAL EVERY 8 HOURS PRN
Qty: 21 TABLET | Refills: 0 | Status: SHIPPED | OUTPATIENT
Start: 2025-08-30 | End: 2025-09-06

## 2025-09-03 ENCOUNTER — OFFICE VISIT (OUTPATIENT)
Age: 65
End: 2025-09-03

## 2025-09-03 DIAGNOSIS — Z98.890 POST-OPERATIVE STATE: ICD-10-CM

## 2025-09-03 DIAGNOSIS — S82.192D OTHER CLOSED FRACTURE OF PROXIMAL END OF LEFT TIBIA WITH ROUTINE HEALING, SUBSEQUENT ENCOUNTER: Primary | ICD-10-CM

## (undated) DEVICE — SUTURE ABSORBABLE BRAIDED 2-0 CT-1 27 IN UD VICRYL J259H

## (undated) DEVICE — Device

## (undated) DEVICE — WATERPROOF, BACTERIA PROOF DRESSING WITH ABSORBENT SEE THROUGH PAD: Brand: OPSITE POST-OP VISIBLE 15X10CM CTN 20

## (undated) DEVICE — BIT DRILL CALIBRATED 4.2 EX-LONG

## (undated) DEVICE — CLEAN UP KIT: Brand: MEDLINE INDUSTRIES, INC.

## (undated) DEVICE — INTENDED FOR TISSUE SEPARATION, AND OTHER PROCEDURES THAT REQUIRE A SHARP SURGICAL BLADE TO PUNCTURE OR CUT.: Brand: BARD-PARKER SAFETY BLADES SIZE 10, STERILE

## (undated) DEVICE — C-ARMOR C-ARM EQUIPMENT COVERS CLEAR STERILE UNIVERSAL FIT 12 PER CASE: Brand: C-ARMOR

## (undated) DEVICE — PREMIUM DRY TRAY LF: Brand: MEDLINE INDUSTRIES, INC.

## (undated) DEVICE — DEVON LIGHT HANDLE: Brand: DEVON

## (undated) DEVICE — BRACE KNEE 17 27IN R L UNIV FIT UPTO 305IN THGH T SCP

## (undated) DEVICE — DRESSING,GAUZE,XEROFORM,CURAD,1"X8",ST: Brand: CURAD

## (undated) DEVICE — SUTURE VICRYL SZ 0 L27IN ABSRB UD L36MM CT-1 1/2 CIR J260H

## (undated) DEVICE — EVACUATOR SMOKE L 10 FT SMOKE MANAGEMENT EXTENDED EDGE ELECTRODE STERILE DISP

## (undated) DEVICE — DRAPE C ARM UNIV W41XL74IN CLR PLAS XR VELC CLSR POLY STRP

## (undated) DEVICE — IMPLANTABLE DEVICE
Type: IMPLANTABLE DEVICE | Site: LEG | Status: NON-FUNCTIONAL
Removed: 2025-07-30

## (undated) DEVICE — SHEET,DRAPE,70X100,STERILE: Brand: MEDLINE

## (undated) DEVICE — BANDAGE COBAN 4 IN COMPR W4INXL5YD FOAM COHESIVE QUIK STK SELF ADH SFT

## (undated) DEVICE — TUBING, SUCTION, 3/16" X 12', STRAIGHT: Brand: MEDLINE

## (undated) DEVICE — BIT DRL L145MM DIA4.2MM NONSTERILE 3 FLUT NDL PNT QUIK CPL

## (undated) DEVICE — GUIDEWIRE ORTH L400MM DIA3.2MM FOR TFN

## (undated) DEVICE — ELECTRODE PT RET AD L9FT HI MOIST COND ADH HYDRGEL CORDED

## (undated) DEVICE — PADDING CAST W6INXL4YD COT LO LINTING WYTEX

## (undated) DEVICE — SOLUTION IRRIG 1000ML 0.9% SOD CHL USP POUR PLAS BTL

## (undated) DEVICE — GLOVE ORTHO 7 1/2   MSG9475

## (undated) DEVICE — GOWN,REINFORCED,POLY,AURORA,XLARGE,STRL: Brand: MEDLINE

## (undated) DEVICE — THREE-QUARTER SHEET: Brand: CONVERTORS

## (undated) DEVICE — 3 ML SYRINGE WITH HYPODERMIC SAFETY NEEDLE: Brand: MAGELLAN

## (undated) DEVICE — BIT DRILL TROCAR LONG F/ NAILS 8-11 SILE

## (undated) DEVICE — YANKAUER,SMOOTH HANDLE,HIGH CAPACITY: Brand: MEDLINE INDUSTRIES, INC.

## (undated) DEVICE — APPLICATOR MEDICATED 3 CC SOLUTION HI LT ORNG CHLORAPREP 930415

## (undated) DEVICE — WATERPROOF, BACTERIA PROOF DRESSING WITH ABSORBENT SEE THROUGH PAD: Brand: OPSITE POST-OP VISIBLE 10X8CM CTN 20